# Patient Record
Sex: FEMALE | Race: WHITE | NOT HISPANIC OR LATINO | Employment: FULL TIME | URBAN - METROPOLITAN AREA
[De-identification: names, ages, dates, MRNs, and addresses within clinical notes are randomized per-mention and may not be internally consistent; named-entity substitution may affect disease eponyms.]

---

## 2023-06-13 LAB
ALBUMIN (G/DL) IN SER/PLAS: 3.9 G/DL (ref 3.4–5)
ANION GAP IN SER/PLAS: 12 MMOL/L (ref 10–20)
CALCIUM (MG/DL) IN SER/PLAS: 9.9 MG/DL (ref 8.6–10.3)
CARBON DIOXIDE, TOTAL (MMOL/L) IN SER/PLAS: 28 MMOL/L (ref 21–32)
CHLORIDE (MMOL/L) IN SER/PLAS: 104 MMOL/L (ref 98–107)
CREATININE (MG/DL) IN SER/PLAS: 1.03 MG/DL (ref 0.5–1.05)
CREATININE (MG/DL) IN URINE: 77.5 MG/DL (ref 20–320)
ERYTHROCYTE DISTRIBUTION WIDTH (RATIO) BY AUTOMATED COUNT: 13.1 % (ref 11.5–14.5)
ERYTHROCYTE MEAN CORPUSCULAR HEMOGLOBIN CONCENTRATION (G/DL) BY AUTOMATED: 31.7 G/DL (ref 32–36)
ERYTHROCYTE MEAN CORPUSCULAR VOLUME (FL) BY AUTOMATED COUNT: 90 FL (ref 80–100)
ERYTHROCYTES (10*6/UL) IN BLOOD BY AUTOMATED COUNT: 4.41 X10E12/L (ref 4–5.2)
GFR FEMALE: 60 ML/MIN/1.73M2
GLUCOSE (MG/DL) IN SER/PLAS: 97 MG/DL (ref 74–99)
HEMATOCRIT (%) IN BLOOD BY AUTOMATED COUNT: 39.7 % (ref 36–46)
HEMOGLOBIN (G/DL) IN BLOOD: 12.6 G/DL (ref 12–16)
LEUKOCYTES (10*3/UL) IN BLOOD BY AUTOMATED COUNT: 4.7 X10E9/L (ref 4.4–11.3)
MAGNESIUM (MG/DL) IN SER/PLAS: 1.6 MG/DL (ref 1.6–2.4)
PHOSPHATE (MG/DL) IN SER/PLAS: 3.4 MG/DL (ref 2.5–4.9)
PLATELETS (10*3/UL) IN BLOOD AUTOMATED COUNT: 175 X10E9/L (ref 150–450)
POTASSIUM (MMOL/L) IN SER/PLAS: 4.6 MMOL/L (ref 3.5–5.3)
PROTEIN (MG/DL) IN URINE: 5 MG/DL (ref 5–24)
PROTEIN/CREATININE (MG/MG) IN URINE: 0.06 MG/MG CREAT (ref 0–0.17)
SODIUM (MMOL/L) IN SER/PLAS: 139 MMOL/L (ref 136–145)
TACROLIMUS (NG/ML) IN BLOOD: 7.1 NG/ML (ref 2–15)
UREA NITROGEN (MG/DL) IN SER/PLAS: 28 MG/DL (ref 6–23)

## 2023-09-14 LAB
ALBUMIN (G/DL) IN SER/PLAS: 3.9 G/DL (ref 3.4–5)
ANION GAP IN SER/PLAS: 11 MMOL/L (ref 10–20)
CALCIUM (MG/DL) IN SER/PLAS: 9.6 MG/DL (ref 8.6–10.3)
CARBON DIOXIDE, TOTAL (MMOL/L) IN SER/PLAS: 27 MMOL/L (ref 21–32)
CHLORIDE (MMOL/L) IN SER/PLAS: 105 MMOL/L (ref 98–107)
CREATININE (MG/DL) IN SER/PLAS: 0.95 MG/DL (ref 0.5–1.05)
CREATININE (MG/DL) IN URINE: 75.1 MG/DL (ref 20–320)
ERYTHROCYTE DISTRIBUTION WIDTH (RATIO) BY AUTOMATED COUNT: 13.2 % (ref 11.5–14.5)
ERYTHROCYTE MEAN CORPUSCULAR HEMOGLOBIN CONCENTRATION (G/DL) BY AUTOMATED: 32.3 G/DL (ref 32–36)
ERYTHROCYTE MEAN CORPUSCULAR VOLUME (FL) BY AUTOMATED COUNT: 90 FL (ref 80–100)
ERYTHROCYTES (10*6/UL) IN BLOOD BY AUTOMATED COUNT: 4.43 X10E12/L (ref 4–5.2)
GFR FEMALE: 66 ML/MIN/1.73M2
GLUCOSE (MG/DL) IN SER/PLAS: 97 MG/DL (ref 74–99)
HEMATOCRIT (%) IN BLOOD BY AUTOMATED COUNT: 39.9 % (ref 36–46)
HEMOGLOBIN (G/DL) IN BLOOD: 12.9 G/DL (ref 12–16)
LEUKOCYTES (10*3/UL) IN BLOOD BY AUTOMATED COUNT: 3.3 X10E9/L (ref 4.4–11.3)
MAGNESIUM (MG/DL) IN SER/PLAS: 1.78 MG/DL (ref 1.6–2.4)
PHOSPHATE (MG/DL) IN SER/PLAS: 3.2 MG/DL (ref 2.5–4.9)
PLATELETS (10*3/UL) IN BLOOD AUTOMATED COUNT: 170 X10E9/L (ref 150–450)
POTASSIUM (MMOL/L) IN SER/PLAS: 4.5 MMOL/L (ref 3.5–5.3)
PROTEIN (MG/DL) IN URINE: 5 MG/DL (ref 5–24)
PROTEIN/CREATININE (MG/MG) IN URINE: 0.07 MG/MG CREAT (ref 0–0.17)
SODIUM (MMOL/L) IN SER/PLAS: 138 MMOL/L (ref 136–145)
TACROLIMUS (NG/ML) IN BLOOD: 7.8 NG/ML (ref 2–15)
UREA NITROGEN (MG/DL) IN SER/PLAS: 25 MG/DL (ref 6–23)

## 2023-09-15 LAB
BASOPHILS (10*3/UL) IN BLOOD BY AUTOMATED COUNT: 0.03 X10E9/L (ref 0–0.1)
BASOPHILS/100 LEUKOCYTES IN BLOOD BY AUTOMATED COUNT: 0.9 % (ref 0–2)
EOSINOPHILS (10*3/UL) IN BLOOD BY AUTOMATED COUNT: 0.03 X10E9/L (ref 0–0.7)
EOSINOPHILS/100 LEUKOCYTES IN BLOOD BY AUTOMATED COUNT: 0.9 % (ref 0–6)
IMMATURE GRANULOCYTES/100 LEUKOCYTES IN BLOOD BY AUTOMATED COUNT: 0.3 % (ref 0–0.9)
LYMPHOCYTES (10*3/UL) IN BLOOD BY AUTOMATED COUNT: 1.19 X10E9/L (ref 1.2–4.8)
LYMPHOCYTES/100 LEUKOCYTES IN BLOOD BY AUTOMATED COUNT: 33.8 % (ref 13–44)
MONOCYTES (10*3/UL) IN BLOOD BY AUTOMATED COUNT: 0.29 X10E9/L (ref 0.1–1)
MONOCYTES/100 LEUKOCYTES IN BLOOD BY AUTOMATED COUNT: 8.2 % (ref 2–10)
NEUTROPHILS (10*3/UL) IN BLOOD BY AUTOMATED COUNT: 1.97 X10E9/L (ref 1.2–7.7)
NEUTROPHILS/100 LEUKOCYTES IN BLOOD BY AUTOMATED COUNT: 55.9 % (ref 40–80)

## 2023-09-18 LAB
CYTOMEGALOVIRUS DNA, PCR COMMENT: NORMAL
CYTOMEGALOVIRUS DNA, PCR IU/ML: NOT DETECTED IU/ML
CYTOMEGALOVIRUS DNA, PCR LOG IU/ML: NORMAL LOG IU/ML
EBV PCR PLASMA LOG IU/ML: NORMAL LOG IU/ML
EBV PCR, QUANT, PLASMA: NOT DETECTED IU/ML

## 2023-09-26 LAB
ALBUMIN (G/DL) IN SER/PLAS: 4 G/DL (ref 3.4–5)
ANION GAP IN SER/PLAS: 11 MMOL/L (ref 10–20)
BASOPHILS (10*3/UL) IN BLOOD BY AUTOMATED COUNT: 0.03 X10E9/L (ref 0–0.1)
BASOPHILS/100 LEUKOCYTES IN BLOOD BY AUTOMATED COUNT: 0.7 % (ref 0–2)
CALCIUM (MG/DL) IN SER/PLAS: 9.9 MG/DL (ref 8.6–10.3)
CARBON DIOXIDE, TOTAL (MMOL/L) IN SER/PLAS: 27 MMOL/L (ref 21–32)
CHLORIDE (MMOL/L) IN SER/PLAS: 105 MMOL/L (ref 98–107)
CREATININE (MG/DL) IN SER/PLAS: 0.97 MG/DL (ref 0.5–1.05)
EOSINOPHILS (10*3/UL) IN BLOOD BY AUTOMATED COUNT: 0.04 X10E9/L (ref 0–0.7)
EOSINOPHILS/100 LEUKOCYTES IN BLOOD BY AUTOMATED COUNT: 0.9 % (ref 0–6)
ERYTHROCYTE DISTRIBUTION WIDTH (RATIO) BY AUTOMATED COUNT: 13 % (ref 11.5–14.5)
ERYTHROCYTE MEAN CORPUSCULAR HEMOGLOBIN CONCENTRATION (G/DL) BY AUTOMATED: 33.8 G/DL (ref 32–36)
ERYTHROCYTE MEAN CORPUSCULAR VOLUME (FL) BY AUTOMATED COUNT: 89 FL (ref 80–100)
ERYTHROCYTES (10*6/UL) IN BLOOD BY AUTOMATED COUNT: 4.52 X10E12/L (ref 4–5.2)
GFR FEMALE: 65 ML/MIN/1.73M2
GLUCOSE (MG/DL) IN SER/PLAS: 109 MG/DL (ref 74–99)
HEMATOCRIT (%) IN BLOOD BY AUTOMATED COUNT: 40.2 % (ref 36–46)
HEMOGLOBIN (G/DL) IN BLOOD: 13.6 G/DL (ref 12–16)
IMMATURE GRANULOCYTES/100 LEUKOCYTES IN BLOOD BY AUTOMATED COUNT: 0.2 % (ref 0–0.9)
LEUKOCYTES (10*3/UL) IN BLOOD BY AUTOMATED COUNT: 4.4 X10E9/L (ref 4.4–11.3)
LYMPHOCYTES (10*3/UL) IN BLOOD BY AUTOMATED COUNT: 1.18 X10E9/L (ref 1.2–4.8)
LYMPHOCYTES/100 LEUKOCYTES IN BLOOD BY AUTOMATED COUNT: 26.7 % (ref 13–44)
MONOCYTES (10*3/UL) IN BLOOD BY AUTOMATED COUNT: 0.45 X10E9/L (ref 0.1–1)
MONOCYTES/100 LEUKOCYTES IN BLOOD BY AUTOMATED COUNT: 10.2 % (ref 2–10)
NEUTROPHILS (10*3/UL) IN BLOOD BY AUTOMATED COUNT: 2.71 X10E9/L (ref 1.2–7.7)
NEUTROPHILS/100 LEUKOCYTES IN BLOOD BY AUTOMATED COUNT: 61.3 % (ref 40–80)
PHOSPHATE (MG/DL) IN SER/PLAS: 3.5 MG/DL (ref 2.5–4.9)
PLATELETS (10*3/UL) IN BLOOD AUTOMATED COUNT: 154 X10E9/L (ref 150–450)
POTASSIUM (MMOL/L) IN SER/PLAS: 4.7 MMOL/L (ref 3.5–5.3)
SODIUM (MMOL/L) IN SER/PLAS: 138 MMOL/L (ref 136–145)
TACROLIMUS (NG/ML) IN BLOOD: 9.9 NG/ML (ref 2–15)
UREA NITROGEN (MG/DL) IN SER/PLAS: 26 MG/DL (ref 6–23)

## 2023-10-17 ENCOUNTER — LAB (OUTPATIENT)
Dept: LAB | Facility: LAB | Age: 68
End: 2023-10-17
Payer: MEDICARE

## 2023-10-17 DIAGNOSIS — D84.9 IMMUNODEFICIENCY, UNSPECIFIED (MULTI): Primary | ICD-10-CM

## 2023-10-17 DIAGNOSIS — Z94.0 KIDNEY REPLACED BY TRANSPLANT (HHS-HCC): ICD-10-CM

## 2023-10-17 LAB
ALBUMIN SERPL BCP-MCNC: 3.7 G/DL (ref 3.4–5)
ANION GAP SERPL CALC-SCNC: 11 MMOL/L (ref 10–20)
BUN SERPL-MCNC: 16 MG/DL (ref 6–23)
CALCIUM SERPL-MCNC: 9.3 MG/DL (ref 8.6–10.3)
CHLORIDE SERPL-SCNC: 106 MMOL/L (ref 98–107)
CO2 SERPL-SCNC: 26 MMOL/L (ref 21–32)
CREAT SERPL-MCNC: 0.89 MG/DL (ref 0.5–1.05)
ERYTHROCYTE [DISTWIDTH] IN BLOOD BY AUTOMATED COUNT: 13.1 % (ref 11.5–14.5)
GFR SERPL CREATININE-BSD FRML MDRD: 72 ML/MIN/1.73M*2
GLUCOSE SERPL-MCNC: 100 MG/DL (ref 74–99)
HCT VFR BLD AUTO: 38.7 % (ref 36–46)
HGB BLD-MCNC: 12.6 G/DL (ref 12–16)
MAGNESIUM SERPL-MCNC: 1.75 MG/DL (ref 1.6–2.4)
MCH RBC QN AUTO: 29.2 PG (ref 26–34)
MCHC RBC AUTO-ENTMCNC: 32.6 G/DL (ref 32–36)
MCV RBC AUTO: 90 FL (ref 80–100)
NRBC BLD-RTO: 0 /100 WBCS (ref 0–0)
PHOSPHATE SERPL-MCNC: 3.3 MG/DL (ref 2.5–4.9)
PLATELET # BLD AUTO: 168 X10*3/UL (ref 150–450)
PMV BLD AUTO: 10.1 FL (ref 7.5–11.5)
POTASSIUM SERPL-SCNC: 4.4 MMOL/L (ref 3.5–5.3)
RBC # BLD AUTO: 4.31 X10*6/UL (ref 4–5.2)
SODIUM SERPL-SCNC: 139 MMOL/L (ref 136–145)
WBC # BLD AUTO: 3.1 X10*3/UL (ref 4.4–11.3)

## 2023-10-17 PROCEDURE — 83735 ASSAY OF MAGNESIUM: CPT

## 2023-10-17 PROCEDURE — 80197 ASSAY OF TACROLIMUS: CPT

## 2023-10-17 PROCEDURE — 85027 COMPLETE CBC AUTOMATED: CPT

## 2023-10-17 PROCEDURE — 36415 COLL VENOUS BLD VENIPUNCTURE: CPT

## 2023-10-17 PROCEDURE — 80069 RENAL FUNCTION PANEL: CPT

## 2023-10-18 ENCOUNTER — DOCUMENTATION (OUTPATIENT)
Dept: TRANSPLANT | Facility: HOSPITAL | Age: 68
End: 2023-10-18
Payer: MEDICARE

## 2023-10-18 DIAGNOSIS — Z94.0 KIDNEY REPLACED BY TRANSPLANT (HHS-HCC): ICD-10-CM

## 2023-10-18 LAB — TACROLIMUS BLD-MCNC: 5.4 NG/ML

## 2023-10-18 NOTE — PROGRESS NOTES
WBC 3.1, 4.4, 3.3, 4.7   On  mg BID  -  Reviewed with Dr. Pierce, add on CMV/EBV and continue to monitor. No changes to meds.  Labs added on

## 2023-10-19 LAB
CMV DNA SERPL NAA+PROBE-LOG IU: NORMAL {LOG_IU}/ML
LABORATORY COMMENT REPORT: NOT DETECTED

## 2023-11-13 ENCOUNTER — SPECIALTY PHARMACY (OUTPATIENT)
Dept: PHARMACY | Facility: CLINIC | Age: 68
End: 2023-11-13

## 2023-11-13 ENCOUNTER — PHARMACY VISIT (OUTPATIENT)
Dept: PHARMACY | Facility: CLINIC | Age: 68
End: 2023-11-13
Payer: COMMERCIAL

## 2023-11-13 PROCEDURE — RXMED WILLOW AMBULATORY MEDICATION CHARGE

## 2023-12-18 PROCEDURE — RXMED WILLOW AMBULATORY MEDICATION CHARGE

## 2023-12-19 ENCOUNTER — PHARMACY VISIT (OUTPATIENT)
Dept: PHARMACY | Facility: CLINIC | Age: 68
End: 2023-12-19
Payer: COMMERCIAL

## 2024-01-11 ENCOUNTER — OFFICE VISIT (OUTPATIENT)
Dept: DERMATOLOGY | Facility: CLINIC | Age: 69
End: 2024-01-11
Payer: MEDICARE

## 2024-01-11 DIAGNOSIS — L57.0 ACTINIC KERATOSIS: ICD-10-CM

## 2024-01-11 DIAGNOSIS — Z12.83 SCREENING EXAM FOR SKIN CANCER: ICD-10-CM

## 2024-01-11 DIAGNOSIS — D48.5 NEOPLASM OF UNCERTAIN BEHAVIOR OF SKIN: ICD-10-CM

## 2024-01-11 DIAGNOSIS — L82.1 SEBORRHEIC KERATOSIS: ICD-10-CM

## 2024-01-11 DIAGNOSIS — L90.5 SCAR CONDITIONS AND FIBROSIS OF SKIN: Primary | ICD-10-CM

## 2024-01-11 DIAGNOSIS — L82.0 INFLAMED SEBORRHEIC KERATOSIS: ICD-10-CM

## 2024-01-11 DIAGNOSIS — L57.8 SUN-DAMAGED SKIN: ICD-10-CM

## 2024-01-11 DIAGNOSIS — L81.4 LENTIGO: ICD-10-CM

## 2024-01-11 DIAGNOSIS — D22.9 MULTIPLE BENIGN NEVI: ICD-10-CM

## 2024-01-11 DIAGNOSIS — Z85.820 PERSONAL HISTORY OF MALIGNANT MELANOMA OF SKIN: ICD-10-CM

## 2024-01-11 DIAGNOSIS — D18.01 HEMANGIOMA OF SKIN: ICD-10-CM

## 2024-01-11 PROCEDURE — 1125F AMNT PAIN NOTED PAIN PRSNT: CPT | Performed by: DERMATOLOGY

## 2024-01-11 PROCEDURE — 88305 TISSUE EXAM BY PATHOLOGIST: CPT | Performed by: DERMATOLOGY

## 2024-01-11 PROCEDURE — 99213 OFFICE O/P EST LOW 20 MIN: CPT | Performed by: DERMATOLOGY

## 2024-01-11 PROCEDURE — 17003 DESTRUCT PREMALG LES 2-14: CPT

## 2024-01-11 PROCEDURE — 1036F TOBACCO NON-USER: CPT | Performed by: DERMATOLOGY

## 2024-01-11 PROCEDURE — 1159F MED LIST DOCD IN RCRD: CPT | Performed by: DERMATOLOGY

## 2024-01-11 PROCEDURE — 11102 TANGNTL BX SKIN SINGLE LES: CPT

## 2024-01-11 PROCEDURE — 17000 DESTRUCT PREMALG LESION: CPT

## 2024-01-11 RX ORDER — FLUOROURACIL 50 MG/G
CREAM TOPICAL 2 TIMES DAILY
Qty: 40 G | Refills: 11 | Status: SHIPPED | OUTPATIENT
Start: 2024-01-11 | End: 2024-01-25

## 2024-01-11 RX ORDER — DULOXETIN HYDROCHLORIDE 30 MG/1
1 CAPSULE, DELAYED RELEASE ORAL EVERY 24 HOURS
COMMUNITY

## 2024-01-11 RX ORDER — CEFADROXIL 500 MG/1
500 CAPSULE ORAL DAILY
COMMUNITY
End: 2024-01-23 | Stop reason: SDUPTHER

## 2024-01-11 RX ORDER — DICYCLOMINE HYDROCHLORIDE 10 MG/1
CAPSULE ORAL EVERY 12 HOURS
COMMUNITY
Start: 2016-12-30

## 2024-01-11 RX ORDER — CA/D3/MAG OX/ZINC/COP/MANG/BOR 600 MG-800
TABLET,CHEWABLE ORAL EVERY 12 HOURS
COMMUNITY

## 2024-01-11 ASSESSMENT — DERMATOLOGY PATIENT ASSESSMENT
DO YOU HAVE ANY NEW OR CHANGING LESIONS: NO
HAVE YOU HAD OR DO YOU HAVE VASCULAR DISEASE: NO
DO YOU USE SUNSCREEN: OCCASIONALLY
HAVE YOU HAD OR DO YOU HAVE A STAPH INFECTION: NO
DO YOU USE A TANNING BED: NO
ARE YOU AN ORGAN TRANSPLANT RECIPIENT: YES
ARE YOU ON BIRTH CONTROL: NO
ARE YOU TRYING TO GET PREGNANT: NO

## 2024-01-11 ASSESSMENT — DERMATOLOGY QUALITY OF LIFE (QOL) ASSESSMENT
RATE HOW BOTHERED YOU ARE BY SYMPTOMS OF YOUR SKIN PROBLEM (EG, ITCHING, STINGING BURNING, HURTING OR SKIN IRRITATION): 0 - NEVER BOTHERED
RATE HOW EMOTIONALLY BOTHERED YOU ARE BY YOUR SKIN PROBLEM (FOR EXAMPLE, WORRY, EMBARRASSMENT, FRUSTRATION): 0 - NEVER BOTHERED
RATE HOW BOTHERED YOU ARE BY EFFECTS OF YOUR SKIN PROBLEMS ON YOUR ACTIVITIES (EG, GOING OUT, ACCOMPLISHING WHAT YOU WANT, WORK ACTIVITIES OR YOUR RELATIONSHIPS WITH OTHERS): 0 - NEVER BOTHERED
ARE THERE EXCLUSIONS OR EXCEPTIONS FOR THE QUALITY OF LIFE ASSESSMENT: NO
WHAT SINGLE SKIN CONDITION LISTED BELOW IS THE PATIENT ANSWERING THE QUALITY-OF-LIFE ASSESSMENT QUESTIONS ABOUT: NONE OF THE ABOVE
DATE THE QUALITY-OF-LIFE ASSESSMENT WAS COMPLETED: 66850

## 2024-01-11 ASSESSMENT — ITCH NUMERIC RATING SCALE: HOW SEVERE IS YOUR ITCHING?: 0

## 2024-01-11 ASSESSMENT — PATIENT GLOBAL ASSESSMENT (PGA): PATIENT GLOBAL ASSESSMENT: PATIENT GLOBAL ASSESSMENT:  1 - CLEAR

## 2024-01-11 NOTE — PROGRESS NOTES
Subjective   Cady Neal is a 68 y.o. female who presents for the following: Skin Check (Patient presents for 6 month skin exam).    Skin Cancer Screening  She has a history of moderate sun exposure. She is in the sun occasionally. She uses sunscreen occasionally. She reports no skin symptoms. Her moles are not changing.    Spots that concern her: None    History of Skin Cancer  Melanoma - left upper arm s/p mohs 11/12/2020 with Dr. 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 Alejandro    Critical information:  Kidney transplant      Objective   Well appearing patient in no apparent distress; mood and affect are within normal limits.    A full examination was performed including scalp, head, eyes, ears, nose, lips, neck, chest, axillae, abdomen, back, buttocks, bilateral upper extremities, bilateral lower extremities, hands, feet, fingers, toes, fingernails, and toenails. All findings within normal limits unless otherwise noted below.    Well-healed scar on left upper arm     Scattered cherry-red papule(s).    Scattered tan macules in sun-exposed areas.    Scattered, uniform and benign-appearing, regular brown melanocytic papules and macules.    Stuck on verrucous, tan-brown papules and plaques.      Diffuse photodamage with actinic changes with telangiectasia and mottled pigmentation in sun-exposed areas.      Right Antecubital Fossa  On the right antecubital fossa, there is a brown pigmented macule with darker pigmentation within.               Left eyebrow, Mid Forehead  Erythematous macules with gritty scale.    Left Malar Cheek, Right Lower Leg - Posterior  On the right posterior lower leg there is an erythematous scaly plaque. On the left cheek there is a skin colored to slightly erythematous scaly papule.       Assessment/Plan   Personal history of malignant melanoma of skin    -Malignant melanoma of left upper arm (breslow 0.2mm) s/p mohs 11/12/2020 with Dr. Catherine    Hemangioma of  skin    - Discussed benign nature and that no treatment is necessary unless it becomes painful or increases in size. Patient opts for clinical monitoring at this time.     Lentigo    - Discussed benign nature and that no treatment is necessary unless it becomes painful or increases in size. Patient opts for clinical monitoring at this time.     Multiple benign nevi    - Discussed benign nature and that no treatment is necessary unless it becomes painful or increases in size. Patient opts for clinical monitoring at this time.     Seborrheic keratosis    - Discussed benign nature and that no treatment is necessary unless it becomes painful or increases in size. Patient opts for clinical monitoring at this time.     Sun-damaged skin    Photodamage.  The signs and symptoms of skin cancer were reviewed and the patient was advised to practice sun protection and sun avoidance, use daily sunscreen, and perform regular self skin exams.      Neoplasm of uncertain behavior of skin  Right Antecubital Fossa    Lesion biopsy  Type of biopsy: tangential    Informed consent: discussed and consent obtained    Timeout: patient name, date of birth, surgical site, and procedure verified    Procedure prep:  Patient was prepped and draped  Anesthesia: the lesion was anesthetized in a standard fashion    Anesthetic:  1% lidocaine w/ epinephrine 1-100,000 local infiltration  Instrument used: DermaBlade    Hemostasis achieved with: aluminum chloride    Outcome: patient tolerated procedure well    Post-procedure details: sterile dressing applied and wound care instructions given    Dressing type: petrolatum and bandage      Specimen 1 - Dermatopathology- DERM LAB  Differential Diagnosis: dysplastic nevus vs melanoma   Check Margins Yes/No?:    Comments:    Dermpath Lab: Routine Histopathology (formalin-fixed tissue)    Actinic keratosis (2)  Mid Forehead; Left eyebrow    Actinic Keratoses - left eyebrow, mid forehead, and right cheek.     -The pre-cancerous nature of these lesions and treatment options were discussed with the patient today.  Recommend treatment of actinic keratoses on left eyebrow and mid forehead with LN2. Due to size of actinic keratosis on right cheek recommend LN2.   -After discussion, patient agreeable to plan.   -The risks, benefits, and side effects of these medications, including the redness, irritation, and discomfort associated with Efudex were discussed. Rx sent.       Destr of lesion - Left eyebrow, Mid Forehead  Complexity: simple    Destruction method: cryotherapy    Informed consent: discussed and consent obtained    Lesion destroyed using liquid nitrogen: Yes    Cryotherapy cycles:  1  Outcome: patient tolerated procedure well with no complications    Post-procedure details: wound care instructions given      Related Medications  fluorouracil (Efudex) 5 % cream  Apply topically 2 times a day for 14 days. Apply to the affected sharon of the right cheek twice daily for a total of 2 weeks. Wash hands thoroughly after each application.    Inflamed seborrheic keratosis (2)  Right Lower Leg - Posterior; Left Malar Cheek    Destr of lesion - Left Malar Cheek, Right Lower Leg - Posterior  Complexity: simple    Destruction method: cryotherapy    Informed consent: discussed and consent obtained    Lesion destroyed using liquid nitrogen: Yes    Cryotherapy cycles:  1  Outcome: patient tolerated procedure well with no complications    Post-procedure details: wound care instructions given      Screening exam for skin cancer    Full body skin exam  -No additional lesions concerning for malignancy on the remainder the skin exam today   -Scars from prior skin treatments were evaluated and no evidence of recurrence.  - The ugly duckling sign was discussed. Monitor for any skin lesions that are different in color, shape, or size than others on body  -Sun protection was discussed. Recommend SPF 30+, hats with brims, sun protective  clothing, and avoiding sun exposure between 10 AM and 2 PM whenever possible  -Recommend regular skin exams or sooner if new or changing lesions         RTC in 6 months for FBSE.     Michelle Rodriguez, DO     I saw and evaluated the patient. I personally obtained the key and critical portions of the history and physical exam or was physically present for key and critical portions performed by the student/resident. I reviewed the student/resident's documentation and discussed the patient with the student/resident. I was present for the entirety of any procedure(s). I agree with the student/resident's medical decision making as documented in the note.

## 2024-01-15 LAB
LABORATORY COMMENT REPORT: NORMAL
PATH REPORT.FINAL DX SPEC: NORMAL
PATH REPORT.GROSS SPEC: NORMAL
PATH REPORT.MICROSCOPIC SPEC OTHER STN: NORMAL
PATH REPORT.RELEVANT HX SPEC: NORMAL
PATH REPORT.TOTAL CANCER: NORMAL

## 2024-01-22 ENCOUNTER — SPECIALTY PHARMACY (OUTPATIENT)
Dept: PHARMACY | Facility: CLINIC | Age: 69
End: 2024-01-22

## 2024-01-22 ENCOUNTER — PHARMACY VISIT (OUTPATIENT)
Dept: PHARMACY | Facility: CLINIC | Age: 69
End: 2024-01-22
Payer: COMMERCIAL

## 2024-01-22 PROCEDURE — RXMED WILLOW AMBULATORY MEDICATION CHARGE

## 2024-01-23 ENCOUNTER — OFFICE VISIT (OUTPATIENT)
Dept: INFECTIOUS DISEASES | Facility: CLINIC | Age: 69
End: 2024-01-23
Payer: MEDICARE

## 2024-01-23 VITALS
TEMPERATURE: 98.1 F | BODY MASS INDEX: 29.88 KG/M2 | SYSTOLIC BLOOD PRESSURE: 111 MMHG | HEART RATE: 67 BPM | DIASTOLIC BLOOD PRESSURE: 74 MMHG | HEIGHT: 64 IN | WEIGHT: 175 LBS

## 2024-01-23 DIAGNOSIS — N39.0 RECURRENT UTI: Primary | ICD-10-CM

## 2024-01-23 PROCEDURE — 99214 OFFICE O/P EST MOD 30 MIN: CPT | Performed by: INTERNAL MEDICINE

## 2024-01-23 PROCEDURE — 1125F AMNT PAIN NOTED PAIN PRSNT: CPT | Performed by: INTERNAL MEDICINE

## 2024-01-23 PROCEDURE — 1159F MED LIST DOCD IN RCRD: CPT | Performed by: INTERNAL MEDICINE

## 2024-01-23 PROCEDURE — 1036F TOBACCO NON-USER: CPT | Performed by: INTERNAL MEDICINE

## 2024-01-23 RX ORDER — CHOLECALCIFEROL (VITAMIN D3) 50 MCG
20 CAPSULE ORAL 2 TIMES DAILY
COMMUNITY
Start: 2008-08-27

## 2024-01-23 RX ORDER — DOXYCYCLINE 100 MG/1
100 TABLET ORAL DAILY
COMMUNITY

## 2024-01-23 RX ORDER — ACETAMINOPHEN 500 MG
1 TABLET ORAL DAILY
COMMUNITY
Start: 2008-08-27 | End: 2024-03-22 | Stop reason: SDUPTHER

## 2024-01-23 RX ORDER — CEFADROXIL 500 MG/1
500 CAPSULE ORAL DAILY
Qty: 90 CAPSULE | Refills: 3 | Status: SHIPPED | OUTPATIENT
Start: 2024-01-23 | End: 2024-04-22

## 2024-01-23 RX ORDER — SOD SULF/POT CHLORIDE/MAG SULF 1.479 G
TABLET ORAL
COMMUNITY
Start: 2023-04-12

## 2024-01-23 ASSESSMENT — ENCOUNTER SYMPTOMS
DEPRESSION: 0
OCCASIONAL FEELINGS OF UNSTEADINESS: 1
LOSS OF SENSATION IN FEET: 1

## 2024-01-23 ASSESSMENT — PAIN SCALES - GENERAL: PAINLEVEL: 5

## 2024-01-23 NOTE — LETTER
01/23/24    Curt Chang MD  [id number] [facility] [address]  [place] [address]      Dear Dr. Curt Chang MD,    I am writing to confirm that your patient, Cady Neal, received care in my office on 01/23/24. I have enclosed a summary of the care provided to Cady for your reference.    Please contact me with any questions you may have regarding the visit.    Sincerely,         Mick Malin MD  [address]  [facility] [address]  [place] [id number]    CC: No Recipients

## 2024-01-23 NOTE — PROGRESS NOTES
Prior to seeing the patient we reviewed all the information in the  system from last year     Annual follow-up for recurrent UTIs in the setting of kidney transplant.  We seen her first year ago.  She had been followed for number years by a colleague who left the institution.  She had her on oral cephalosporin chronic suppression which we continue last year because to see me working.  In the past she had been on estrogen topical but not taking this now.  When I saw her today she said she has not had a UTI in the last year.  Continues on cefadroxil without difficulty.    In person visit and patient looks well normal mood and affect normal mentation    Assessment/plan- patient appears to doing very well on chronic suppressive therapy with cefadroxil.  As I told her the first only letter this strategy often does not work in postmenopausal women tend to get frequently colonized to do bacteria but it does appear to work in her.  Her prior urine cultures all appear to be the same straight E. 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  Will continue strategy as long as it works.  Would put a standing order for urine culture urinalysis.  We scheduled her back in a year but we will call her if there is any intercurrent UTIs.  Currently not using topical estrogen.  If she starts having frequent UTIs we would readdress this issue but doing well for now

## 2024-02-12 ENCOUNTER — SPECIALTY PHARMACY (OUTPATIENT)
Dept: PHARMACY | Facility: CLINIC | Age: 69
End: 2024-02-12

## 2024-02-16 ENCOUNTER — PHARMACY VISIT (OUTPATIENT)
Dept: PHARMACY | Facility: CLINIC | Age: 69
End: 2024-02-16
Payer: COMMERCIAL

## 2024-02-16 PROCEDURE — RXMED WILLOW AMBULATORY MEDICATION CHARGE

## 2024-03-13 ENCOUNTER — LAB (OUTPATIENT)
Dept: LAB | Facility: LAB | Age: 69
End: 2024-03-13
Payer: MEDICARE

## 2024-03-13 DIAGNOSIS — D84.9 IMMUNODEFICIENCY, UNSPECIFIED (MULTI): Primary | ICD-10-CM

## 2024-03-13 DIAGNOSIS — Z94.0 KIDNEY REPLACED BY TRANSPLANT (HHS-HCC): ICD-10-CM

## 2024-03-13 DIAGNOSIS — D84.9 IMMUNODEFICIENCY, UNSPECIFIED (MULTI): ICD-10-CM

## 2024-03-13 LAB
ALBUMIN SERPL BCP-MCNC: 3.8 G/DL (ref 3.4–5)
ANION GAP SERPL CALC-SCNC: 11 MMOL/L (ref 10–20)
BUN SERPL-MCNC: 26 MG/DL (ref 6–23)
CALCIUM SERPL-MCNC: 9.4 MG/DL (ref 8.6–10.6)
CHLORIDE SERPL-SCNC: 109 MMOL/L (ref 98–107)
CO2 SERPL-SCNC: 25 MMOL/L (ref 21–32)
CREAT SERPL-MCNC: 0.8 MG/DL (ref 0.5–1.05)
EGFRCR SERPLBLD CKD-EPI 2021: 81 ML/MIN/1.73M*2
ERYTHROCYTE [DISTWIDTH] IN BLOOD BY AUTOMATED COUNT: 13.3 % (ref 11.5–14.5)
GLUCOSE SERPL-MCNC: 108 MG/DL (ref 74–99)
HCT VFR BLD AUTO: 40.2 % (ref 36–46)
HGB BLD-MCNC: 13.1 G/DL (ref 12–16)
MAGNESIUM SERPL-MCNC: 1.76 MG/DL (ref 1.6–2.4)
MCH RBC QN AUTO: 29.1 PG (ref 26–34)
MCHC RBC AUTO-ENTMCNC: 32.6 G/DL (ref 32–36)
MCV RBC AUTO: 89 FL (ref 80–100)
NRBC BLD-RTO: 0 /100 WBCS (ref 0–0)
PHOSPHATE SERPL-MCNC: 3.1 MG/DL (ref 2.5–4.9)
PLATELET # BLD AUTO: 172 X10*3/UL (ref 150–450)
POTASSIUM SERPL-SCNC: 4.3 MMOL/L (ref 3.5–5.3)
RBC # BLD AUTO: 4.5 X10*6/UL (ref 4–5.2)
SODIUM SERPL-SCNC: 141 MMOL/L (ref 136–145)
TACROLIMUS BLD-MCNC: 4.7 NG/ML
WBC # BLD AUTO: 4 X10*3/UL (ref 4.4–11.3)

## 2024-03-13 PROCEDURE — 83735 ASSAY OF MAGNESIUM: CPT

## 2024-03-13 PROCEDURE — 85027 COMPLETE CBC AUTOMATED: CPT

## 2024-03-13 PROCEDURE — 87799 DETECT AGENT NOS DNA QUANT: CPT

## 2024-03-13 PROCEDURE — 80197 ASSAY OF TACROLIMUS: CPT

## 2024-03-13 PROCEDURE — 36415 COLL VENOUS BLD VENIPUNCTURE: CPT

## 2024-03-13 PROCEDURE — 80069 RENAL FUNCTION PANEL: CPT

## 2024-03-14 LAB
EBV DNA SPEC NAA+PROBE-LOG#: NORMAL {LOG_COPIES}/ML
LABORATORY COMMENT REPORT: NOT DETECTED

## 2024-03-15 ENCOUNTER — SPECIALTY PHARMACY (OUTPATIENT)
Dept: PHARMACY | Facility: CLINIC | Age: 69
End: 2024-03-15

## 2024-03-15 PROCEDURE — RXMED WILLOW AMBULATORY MEDICATION CHARGE

## 2024-03-16 ENCOUNTER — PHARMACY VISIT (OUTPATIENT)
Dept: PHARMACY | Facility: CLINIC | Age: 69
End: 2024-03-16
Payer: COMMERCIAL

## 2024-03-22 ENCOUNTER — OFFICE VISIT (OUTPATIENT)
Dept: TRANSPLANT | Facility: HOSPITAL | Age: 69
End: 2024-03-22
Payer: MEDICARE

## 2024-03-22 VITALS
SYSTOLIC BLOOD PRESSURE: 130 MMHG | OXYGEN SATURATION: 100 % | WEIGHT: 175.8 LBS | BODY MASS INDEX: 30.18 KG/M2 | TEMPERATURE: 97.4 F | DIASTOLIC BLOOD PRESSURE: 84 MMHG | HEART RATE: 67 BPM

## 2024-03-22 DIAGNOSIS — I10 ESSENTIAL HYPERTENSION: ICD-10-CM

## 2024-03-22 DIAGNOSIS — E55.9 VITAMIN D DEFICIENCY: ICD-10-CM

## 2024-03-22 DIAGNOSIS — Z94.0 KIDNEY REPLACED BY TRANSPLANT (HHS-HCC): ICD-10-CM

## 2024-03-22 DIAGNOSIS — Z94.0 IMMUNOSUPPRESSIVE MANAGEMENT ENCOUNTER FOLLOWING KIDNEY TRANSPLANT (HHS-HCC): Primary | ICD-10-CM

## 2024-03-22 DIAGNOSIS — Z79.899 IMMUNOSUPPRESSIVE MANAGEMENT ENCOUNTER FOLLOWING KIDNEY TRANSPLANT (HHS-HCC): Primary | ICD-10-CM

## 2024-03-22 DIAGNOSIS — E21.3 HYPERPARATHYROIDISM (MULTI): ICD-10-CM

## 2024-03-22 PROCEDURE — 3079F DIAST BP 80-89 MM HG: CPT | Performed by: INTERNAL MEDICINE

## 2024-03-22 PROCEDURE — 3075F SYST BP GE 130 - 139MM HG: CPT | Performed by: INTERNAL MEDICINE

## 2024-03-22 PROCEDURE — 1036F TOBACCO NON-USER: CPT | Performed by: INTERNAL MEDICINE

## 2024-03-22 PROCEDURE — 99214 OFFICE O/P EST MOD 30 MIN: CPT | Performed by: INTERNAL MEDICINE

## 2024-03-22 PROCEDURE — 1126F AMNT PAIN NOTED NONE PRSNT: CPT | Performed by: INTERNAL MEDICINE

## 2024-03-22 PROCEDURE — 1159F MED LIST DOCD IN RCRD: CPT | Performed by: INTERNAL MEDICINE

## 2024-03-22 RX ORDER — TACROLIMUS 1 MG/1
CAPSULE ORAL
Qty: 60 CAPSULE | Refills: 11 | Status: SHIPPED | OUTPATIENT
Start: 2024-03-22 | End: 2025-03-22

## 2024-03-22 RX ORDER — MYCOPHENOLATE MOFETIL 250 MG/1
500 CAPSULE ORAL 2 TIMES DAILY
Qty: 120 CAPSULE | Refills: 11 | Status: SHIPPED | OUTPATIENT
Start: 2024-03-22 | End: 2025-03-22

## 2024-03-22 ASSESSMENT — PAIN SCALES - GENERAL: PAINLEVEL: 0-NO PAIN

## 2024-03-22 NOTE — PROGRESS NOTES
TRANSPLANT NEPHROLOGY :   OUTPATIENT CLINIC NOTE      SERVICE DATE : 2024    REASON FOR VISIT/CHIEF COMPLAINT:  S/P  TRANSPLANT SURGERY  IMMUNOSUPPRESSIVE MEDICATION MANAGEMENT  BLOOD PRESSURE MANAGEMENT    HPI:    Ms. Neal is a 68 y.o. female with past medical history significant for end stage renal disease secondary to Polycystic kidney disease status post living donor kidney transplant on 07. Donor was her . She underwent bilateral native nephrectomy on 16. She sees ID, for recurrent UTIs and is on suppressive antibiotic prophylaxis. Last visit with ID was in 2024. Per note, Continue prophylactic cefadroxil 500 mg daily and Premarin cream for UTI prevention and follow up in a year.     Patient is here for follow up s/p kidney transplant.    Patient is doing well overall. No new complaints. Denied chest pain, SOB, GOETZ, Palpitation. Normal urination and bowel movement. Normal gait and no weakness of arms/legs. No cough, runny nose, sore throat, cold symptoms, or rash. No hearing loss. Normal vision.No problems with his sleep, mood and function. No recent infection, hospitalization, surgery or ER visits.      ROS:  Review of  14 systems was performed system by system. See HPI. Otherwise, the symptoms were negative.    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     History of polycystic kidney disease    Pruritus vulvae 2014    Vulvar itching    Squamous cell skin cancer         PAST SURGICAL HISTORY:  Past Surgical History:   Procedure Laterality Date     SECTION,  CLASSIC  2014     Section    FOOT SURGERY  2014    Foot Surgery    KIDNEY SURGERY  2013    Kidney Surgery    MR HEAD ANGIO WO IV CONTRAST  2012    MR HEAD ANGIO WO IV CONTRAST 2012 [facility]        SOCIAL HISTORY:  Social History     Socioeconomic History    Marital status:      Spouse name: Not on file    Number of children: Not on file    Years of education: Not on file    Highest education level: Not on file   Occupational History    Not on file   Tobacco Use    Smoking status: Never    Smokeless tobacco: Never   Substance and Sexual Activity    Alcohol use: Not on file    Drug use: Not on file    Sexual activity: Not on file   Other Topics Concern    Not on file   Social History Narrative    Not on file     Social Determinants of Health     Financial Resource Strain: Not on file   Food Insecurity: Not on file   Transportation Needs: Not on file   Physical Activity: Not on file   Stress: Not on file   Social Connections: Not on file   Intimate Partner Violence: Not on file   Housing Stability: Not on file       FAMILY HISTORY:  Family History   Problem Relation Name Age of Onset    Skin cancer Brother         MEDICATION LIST:  Current Outpatient Medications   Medication Instructions    Ca-D3-mag-zinc--colette-boron (Caltrate 600-D Plus Minerals) 600 mg calcium- 800 unit-40 mg tablet,chewable Every 12 hours    calcium carbonate-vitamin D3 (Caltrate with Vitamin D3) 600 mg-20 mcg (800 unit) tablet 1 tablet, oral, Daily    cefadroxil (DURICEF) 500 mg, oral, Daily    dicyclomine (Bentyl) 10 mg capsule Every 12 hours    doxycycline (ADOXA) 100 mg, oral, Daily    DULoxetine (Cymbalta) 30 mg DR capsule 1 capsule, Every 24 hours    mycophenolate (Cellcept) 250 mg capsule TAKE TWO (2) CAPSULES BY MOUTH TWICE DAILY.    omeprazole (PRILOSEC) 20 mg, oral, 2 times daily    Sutab 1.479-0.188- 0.225 gram tablet oral, Use as directed    tacrolimus (Prograf) 1 mg capsule TAKE  ONE (1) CAPSULES BY MOUTH TWICE DAILY.       ALLERGY  No Known Allergies    PHYSICAL EXAM:    Visit Vitals  /84   Pulse 67   Temp 36.3 °C (97.4 °F) (Temporal)   Wt 79.7 kg (175 lb 12.8 oz)   SpO2 100%   BMI 30.18 kg/m²   Smoking Status Never   BSA 1.9 m²          Vital signs - reviewed. Acceptable BP at this office visit.   General Appearance - NAD, Good speech, oriented and alert  HEENT - Supple. Not pale. No jaundice. No cervical lymphadenopathy. Pharynx and tonsils are not injected.  CVS - RRR. Normal S1/S2. No murmur, click , rub or gallop  Lungs- clear to auscultation bilaterally  Abdomen - soft , not tender, no guarding, no rigidity. No hepatosplenomegaly. Normal bowel sounds. No masses and ascites. S/P Kidney transplant .  Transplanted kidney is not tender.   Musculoskeletal /Extremities - no edema. Full ROM. No joint tenderness.   Neuro/Psych - appropriate mood and affect. Motor power V/V all extremities. CN I -XII were grossly intact.  Skin - No visible rash      LABS:    Lab Results   Component Value Date    WBC 4.0 (L) 03/13/2024    HGB 13.1 03/13/2024    HCT 40.2 03/13/2024     03/13/2024    CHOL 182 12/08/2020    TRIG 102 12/08/2020    HDL 60.0 12/08/2020     03/13/2024    K 4.3 03/13/2024     (H) 03/13/2024    CREATININE 0.80 03/13/2024    BUN 26 (H) 03/13/2024    CO2 25 03/13/2024    HGBA1C 4.7 12/08/2020     par    ASSESSMENT AND PLAN:    Ms. Neal is a 68 y.o. female  who is here for follow up s/p kidney transplant.    TRANSPLANT DATE: 11/30/2007 (Kidney)      1. ESRD S/P kidney transplant   - Creatinine last check was :  Lab Results   Component Value Date    CREATININE 0.80 03/13/2024     Creatinine clearance cannot be calculated (Patient's most recent lab result is older than the maximum 7 days allowed.)    - Renal allograft function is EXCELLENT (16 years and 3 months post tx).   -Random urine protein/creatinine ratio is DUE  -Ensure adequate hydration  - Avoid  nephrotoxic medications, NSAIDs, and IV contrast.    2. Immunosuppression  -Tacrolimus level last check was 4.7, at goal. Continue same dose.   -No changes of IS [ Tac 1 mg bid,  MG BID]  -Continue current immunosuppression regimen.    3. Electrolytes  Lab Results   Component Value Date    GLUCOSE 108 (H) 03/13/2024    CALCIUM 9.4 03/13/2024     03/13/2024    K 4.3 03/13/2024    CO2 25 03/13/2024     (H) 03/13/2024    BUN 26 (H) 03/13/2024    CREATININE 0.80 03/13/2024     -Acceptable from last lab drawn    4. Hypertension  Blood Pressures         3/22/2024  0916             BP: 130/84          -Home  BP had been acceptable  -Encourage to monitor home BP  -Continue current anti hypertensive medication    5. Bone Mineral Disease/Osteoporosis  Lab Results   Component Value Date    PTH 95.8 (H) 10/07/2022    CALCIUM 9.4 03/13/2024    PHOS 3.1 03/13/2024    VITD25 51 10/07/2022     -check VIT D, PTH with next lab  - Consider DEXA every 2-3 years , defer to PCP    6.Anemia  Lab Results   Component Value Date    WBC 4.0 (L) 03/13/2024    HGB 13.1 03/13/2024    HCT 40.2 03/13/2024    MCV 89 03/13/2024     03/13/2024     -asymptomatic  - Continue to monitor  -check iron studies and ferritin. Will consider MOISES as needed.  - No indications for blood transfusion     7.Health maintenance and vaccination  - Flu shot during flu season annually  - Cancer screening is up to date per the patient    Lab : Routine transplant lab ( CBC, RFP, and anti-rejection trough level ), UPCratio every 3 months  Additional labs:  VIT D, PTH with next lab    RTC 12 month(s)    Angel Cerda    Transplant Nephrology

## 2024-04-16 PROCEDURE — RXMED WILLOW AMBULATORY MEDICATION CHARGE

## 2024-04-25 ENCOUNTER — SPECIALTY PHARMACY (OUTPATIENT)
Dept: PHARMACY | Facility: CLINIC | Age: 69
End: 2024-04-25

## 2024-04-29 ENCOUNTER — PHARMACY VISIT (OUTPATIENT)
Dept: PHARMACY | Facility: CLINIC | Age: 69
End: 2024-04-29
Payer: COMMERCIAL

## 2024-04-29 ENCOUNTER — SPECIALTY PHARMACY (OUTPATIENT)
Dept: PHARMACY | Facility: CLINIC | Age: 69
End: 2024-04-29

## 2024-05-07 ENCOUNTER — LAB (OUTPATIENT)
Dept: LAB | Facility: LAB | Age: 69
End: 2024-05-07
Payer: MEDICARE

## 2024-05-07 DIAGNOSIS — Z94.0 KIDNEY REPLACED BY TRANSPLANT (HHS-HCC): ICD-10-CM

## 2024-05-07 DIAGNOSIS — E03.9 HYPOTHYROIDISM, UNSPECIFIED: Primary | ICD-10-CM

## 2024-05-07 DIAGNOSIS — E55.9 VITAMIN D2 DEFICIENCY: ICD-10-CM

## 2024-05-07 DIAGNOSIS — E03.9 HYPOTHYROIDISM, UNSPECIFIED: ICD-10-CM

## 2024-05-07 LAB
25(OH)D3 SERPL-MCNC: 48 NG/ML (ref 30–100)
ALBUMIN SERPL BCP-MCNC: 3.7 G/DL (ref 3.4–5)
ANION GAP SERPL CALC-SCNC: 15 MMOL/L (ref 10–20)
BUN SERPL-MCNC: 27 MG/DL (ref 6–23)
CALCIUM SERPL-MCNC: 9.6 MG/DL (ref 8.6–10.6)
CHLORIDE SERPL-SCNC: 107 MMOL/L (ref 98–107)
CO2 SERPL-SCNC: 22 MMOL/L (ref 21–32)
CREAT SERPL-MCNC: 0.82 MG/DL (ref 0.5–1.05)
CRP SERPL-MCNC: 0.21 MG/DL
EGFRCR SERPLBLD CKD-EPI 2021: 79 ML/MIN/1.73M*2
ERYTHROCYTE [DISTWIDTH] IN BLOOD BY AUTOMATED COUNT: 12.9 % (ref 11.5–14.5)
GLUCOSE SERPL-MCNC: 97 MG/DL (ref 74–99)
HCT VFR BLD AUTO: 39.6 % (ref 36–46)
HGB BLD-MCNC: 12.6 G/DL (ref 12–16)
MCH RBC QN AUTO: 28.8 PG (ref 26–34)
MCHC RBC AUTO-ENTMCNC: 31.8 G/DL (ref 32–36)
MCV RBC AUTO: 91 FL (ref 80–100)
NRBC BLD-RTO: 0 /100 WBCS (ref 0–0)
PHOSPHATE SERPL-MCNC: 3.5 MG/DL (ref 2.5–4.9)
PLATELET # BLD AUTO: 157 X10*3/UL (ref 150–450)
POTASSIUM SERPL-SCNC: 4.5 MMOL/L (ref 3.5–5.3)
PTH-INTACT SERPL-MCNC: 66.1 PG/ML (ref 18.5–88)
RBC # BLD AUTO: 4.37 X10*6/UL (ref 4–5.2)
SODIUM SERPL-SCNC: 139 MMOL/L (ref 136–145)
T3FREE SERPL-MCNC: 3.1 PG/ML (ref 2.3–4.2)
T4 FREE SERPL-MCNC: 1.1 NG/DL (ref 0.78–1.48)
TACROLIMUS BLD-MCNC: 16.8 NG/ML
THYROPEROXIDASE AB SERPL-ACNC: 48 IU/ML
TSH SERPL-ACNC: 1.4 MIU/L (ref 0.44–3.98)
WBC # BLD AUTO: 3.8 X10*3/UL (ref 4.4–11.3)

## 2024-05-07 PROCEDURE — 82306 VITAMIN D 25 HYDROXY: CPT

## 2024-05-07 PROCEDURE — 85027 COMPLETE CBC AUTOMATED: CPT

## 2024-05-07 PROCEDURE — 86140 C-REACTIVE PROTEIN: CPT

## 2024-05-07 PROCEDURE — 86376 MICROSOMAL ANTIBODY EACH: CPT

## 2024-05-07 PROCEDURE — 36415 COLL VENOUS BLD VENIPUNCTURE: CPT

## 2024-05-07 PROCEDURE — 80069 RENAL FUNCTION PANEL: CPT

## 2024-05-07 PROCEDURE — 84443 ASSAY THYROID STIM HORMONE: CPT

## 2024-05-07 PROCEDURE — 83970 ASSAY OF PARATHORMONE: CPT

## 2024-05-07 PROCEDURE — 84439 ASSAY OF FREE THYROXINE: CPT

## 2024-05-07 PROCEDURE — 80197 ASSAY OF TACROLIMUS: CPT

## 2024-05-07 PROCEDURE — 84481 FREE ASSAY (FT-3): CPT

## 2024-05-08 ENCOUNTER — TELEPHONE (OUTPATIENT)
Dept: TRANSPLANT | Facility: HOSPITAL | Age: 69
End: 2024-05-08
Payer: MEDICARE

## 2024-05-08 DIAGNOSIS — Z94.0 KIDNEY REPLACED BY TRANSPLANT (HHS-HCC): ICD-10-CM

## 2024-05-08 NOTE — TELEPHONE ENCOUNTER
Reviewed with Dr. Cerda:     Tac; (16.8 on 5/7); (4.7); (5.4)  Previous night too medication between 8:30-9pm. Did not take morning dose.    She is currently taking 1 mg BID. No reports of any diarrhea or gi symptoms.     Plan: Repeat tac level tomorrow.     Spoke with the patient. She will repeat another tac level tomorrow.

## 2024-05-09 ENCOUNTER — LAB (OUTPATIENT)
Dept: LAB | Facility: LAB | Age: 69
End: 2024-05-09
Payer: MEDICARE

## 2024-05-09 DIAGNOSIS — Z94.0 KIDNEY REPLACED BY TRANSPLANT (HHS-HCC): ICD-10-CM

## 2024-05-09 LAB — TACROLIMUS BLD-MCNC: 4.6 NG/ML

## 2024-05-09 PROCEDURE — 80197 ASSAY OF TACROLIMUS: CPT

## 2024-05-09 PROCEDURE — 36415 COLL VENOUS BLD VENIPUNCTURE: CPT

## 2024-05-28 ENCOUNTER — SPECIALTY PHARMACY (OUTPATIENT)
Dept: PHARMACY | Facility: CLINIC | Age: 69
End: 2024-05-28

## 2024-05-28 PROCEDURE — RXMED WILLOW AMBULATORY MEDICATION CHARGE

## 2024-05-30 ENCOUNTER — PHARMACY VISIT (OUTPATIENT)
Dept: PHARMACY | Facility: CLINIC | Age: 69
End: 2024-05-30
Payer: COMMERCIAL

## 2024-06-24 ENCOUNTER — SPECIALTY PHARMACY (OUTPATIENT)
Dept: PHARMACY | Facility: CLINIC | Age: 69
End: 2024-06-24

## 2024-06-24 PROCEDURE — RXMED WILLOW AMBULATORY MEDICATION CHARGE

## 2024-06-25 ENCOUNTER — PHARMACY VISIT (OUTPATIENT)
Dept: PHARMACY | Facility: CLINIC | Age: 69
End: 2024-06-25
Payer: COMMERCIAL

## 2024-07-12 PROBLEM — D09.9 SQUAMOUS CELL CARCINOMA IN SITU (SCCIS): Status: ACTIVE | Noted: 2017-10-12

## 2024-07-12 PROBLEM — L81.4 SOLAR LENTIGO: Status: ACTIVE | Noted: 2024-01-11

## 2024-07-12 PROBLEM — C43.9 MELANOMA (MULTI): Status: ACTIVE | Noted: 2020-10-14

## 2024-07-15 ENCOUNTER — APPOINTMENT (OUTPATIENT)
Dept: DERMATOLOGY | Facility: CLINIC | Age: 69
End: 2024-07-15
Payer: MEDICARE

## 2024-07-15 DIAGNOSIS — L82.1 SEBORRHEIC KERATOSIS: ICD-10-CM

## 2024-07-15 DIAGNOSIS — L81.4 LENTIGO: ICD-10-CM

## 2024-07-15 DIAGNOSIS — L90.5 SCAR CONDITIONS AND FIBROSIS OF SKIN: Primary | ICD-10-CM

## 2024-07-15 DIAGNOSIS — L57.0 ACTINIC KERATOSIS: ICD-10-CM

## 2024-07-15 DIAGNOSIS — Z85.820 PERSONAL HISTORY OF MALIGNANT MELANOMA OF SKIN: ICD-10-CM

## 2024-07-15 DIAGNOSIS — D18.01 HEMANGIOMA OF SKIN: ICD-10-CM

## 2024-07-15 DIAGNOSIS — D22.9 MULTIPLE BENIGN NEVI: ICD-10-CM

## 2024-07-15 PROCEDURE — 1159F MED LIST DOCD IN RCRD: CPT | Performed by: DERMATOLOGY

## 2024-07-15 PROCEDURE — 99213 OFFICE O/P EST LOW 20 MIN: CPT | Performed by: DERMATOLOGY

## 2024-07-15 PROCEDURE — 17003 DESTRUCT PREMALG LES 2-14: CPT | Performed by: DERMATOLOGY

## 2024-07-15 PROCEDURE — 17000 DESTRUCT PREMALG LESION: CPT | Performed by: DERMATOLOGY

## 2024-07-15 NOTE — PROGRESS NOTES
Subjective     Cady Neal is a 68 y.o. female who presents for the following: Skin Check (6 months).     Skin Cancer Screening  She has a history of heavy sun exposure. She is in the sun frequently. She uses sunscreen frequently. She reports no skin symptoms. Her moles are not changing.    Spots that concern her: none    Patient has a history of:   SCCis  - 10/12/17  Kidney Transplant  AK  MM - 10/14/20  SK    Review of Systems:  No other skin or systemic complaints other than what is documented elsewhere in the note.    The following portions of the chart were reviewed this encounter and updated as appropriate:       Skin Cancer History  No skin cancer on file.    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    Past Surgical History:  Cady Neal  has a past surgical history that includes Kidney surgery (2013);  section, classic (2014); Foot surgery (2014); and MR angio head wo IV contrast (2012).    Family History:  Patient family history includes Skin cancer in her brother.       Objective   Well appearing patient in no apparent distress; mood and affect are within normal limits.    A full examination was performed including scalp, head, eyes, ears, nose, lips, neck, chest, axillae, abdomen, back, buttocks, bilateral upper extremities, bilateral lower extremities, hands, feet, fingers, toes, fingernails, and toenails. All findings within normal limits  unless otherwise noted below.    Assessment/Plan   1. Actinic keratosis (7)  Left Nasal Sidewall (2), Mid Forehead (4), Right Temple  Destruction of Actinic Keratosis Procedure Note  Diagnosis: AK  Location: see physical exam  Informed consent: Discussed risks (permanent scarring, light or dark discoloration, infection, pain, bleeding, bruising, redness, blister formation, and recurrence of the lesion) and benefits of the procedure, as well as the alternatives.  Informed consent was obtained.  Anesthesia: not required  Procedure Details:  The lesion was destroyed with liquid nitrogen. The patient tolerated procedure well.  Total number of lesions treated:  7  Plan:  The patient was instructed on post-op care. Recommend OTC analgesia as needed for pain.      Any spots that do not resolve with lN2, pt going to use Efudex that she has at home on them BID x 2 weeks in October. Risks, benefits, side effects, alternatives and options were discussed with patient and the patient voiced understanding. Pt agrees with plan as above.       Destr of lesion - Left Nasal Sidewall (2), Mid Forehead (4), Right Temple  Complexity: simple    Destruction method: cryotherapy    Informed consent: discussed and consent obtained    Lesion destroyed using liquid nitrogen: Yes    Cryotherapy cycles:  1  Outcome: patient tolerated procedure well with no complications    Post-procedure details: wound care instructions given      2. Scar conditions and fibrosis of skin  Scar is well-healed without evidence of recurrence    3. Seborrheic keratosis  Stuck on verrucous, tan-brown papules and plaques.      - Discussed benign nature and that no treatment is necessary unless it becomes painful or increases in size. Patient opts for clinical monitoring at this time.     4. Lentigo  Scattered tan macules in sun-exposed areas.    - Discussed benign nature and that no treatment is necessary unless it becomes painful or increases in size. Patient opts for  clinical monitoring at this time.     5. Hemangioma of skin  Scattered cherry-red papule(s).    - Discussed benign nature and that no treatment is necessary unless it becomes painful or increases in size. Patient opts for clinical monitoring at this time.     6. Multiple benign nevi  Scattered, uniform and benign-appearing, regular brown melanocytic papules and macules.    - Discussed benign nature and that no treatment is necessary unless it becomes painful or increases in size. Patient opts for clinical monitoring at this time.        Follow up 6 months or sooner as needed.

## 2024-07-23 ENCOUNTER — SPECIALTY PHARMACY (OUTPATIENT)
Dept: PHARMACY | Facility: CLINIC | Age: 69
End: 2024-07-23

## 2024-07-23 PROCEDURE — RXMED WILLOW AMBULATORY MEDICATION CHARGE

## 2024-07-24 ENCOUNTER — PHARMACY VISIT (OUTPATIENT)
Dept: PHARMACY | Facility: CLINIC | Age: 69
End: 2024-07-24
Payer: COMMERCIAL

## 2024-07-25 ENCOUNTER — SPECIALTY PHARMACY (OUTPATIENT)
Dept: PHARMACY | Facility: CLINIC | Age: 69
End: 2024-07-25

## 2024-08-02 ENCOUNTER — LAB (OUTPATIENT)
Dept: LAB | Facility: LAB | Age: 69
End: 2024-08-02
Payer: MEDICARE

## 2024-08-02 DIAGNOSIS — Z94.0 KIDNEY REPLACED BY TRANSPLANT (HHS-HCC): ICD-10-CM

## 2024-08-02 DIAGNOSIS — E55.9 VITAMIN D2 DEFICIENCY: ICD-10-CM

## 2024-08-02 LAB
25(OH)D3 SERPL-MCNC: 44 NG/ML (ref 30–100)
ALBUMIN SERPL BCP-MCNC: 3.8 G/DL (ref 3.4–5)
ANION GAP SERPL CALC-SCNC: 13 MMOL/L (ref 10–20)
BUN SERPL-MCNC: 26 MG/DL (ref 6–23)
CALCIUM SERPL-MCNC: 9.9 MG/DL (ref 8.6–10.6)
CHLORIDE SERPL-SCNC: 106 MMOL/L (ref 98–107)
CO2 SERPL-SCNC: 25 MMOL/L (ref 21–32)
CREAT SERPL-MCNC: 0.84 MG/DL (ref 0.5–1.05)
EGFRCR SERPLBLD CKD-EPI 2021: 77 ML/MIN/1.73M*2
ERYTHROCYTE [DISTWIDTH] IN BLOOD BY AUTOMATED COUNT: 12.9 % (ref 11.5–14.5)
GLUCOSE SERPL-MCNC: 103 MG/DL (ref 74–99)
HCT VFR BLD AUTO: 40.5 % (ref 36–46)
HGB BLD-MCNC: 13.2 G/DL (ref 12–16)
MCH RBC QN AUTO: 29.3 PG (ref 26–34)
MCHC RBC AUTO-ENTMCNC: 32.6 G/DL (ref 32–36)
MCV RBC AUTO: 90 FL (ref 80–100)
NRBC BLD-RTO: 0 /100 WBCS (ref 0–0)
PHOSPHATE SERPL-MCNC: 4 MG/DL (ref 2.5–4.9)
PLATELET # BLD AUTO: 177 X10*3/UL (ref 150–450)
POTASSIUM SERPL-SCNC: 4.4 MMOL/L (ref 3.5–5.3)
PTH-INTACT SERPL-MCNC: 80.5 PG/ML (ref 18.5–88)
RBC # BLD AUTO: 4.5 X10*6/UL (ref 4–5.2)
SODIUM SERPL-SCNC: 140 MMOL/L (ref 136–145)
TACROLIMUS BLD-MCNC: 3.5 NG/ML
WBC # BLD AUTO: 4.4 X10*3/UL (ref 4.4–11.3)

## 2024-08-02 PROCEDURE — 36415 COLL VENOUS BLD VENIPUNCTURE: CPT

## 2024-08-02 PROCEDURE — 80197 ASSAY OF TACROLIMUS: CPT

## 2024-08-02 PROCEDURE — 85027 COMPLETE CBC AUTOMATED: CPT

## 2024-08-02 PROCEDURE — 82306 VITAMIN D 25 HYDROXY: CPT

## 2024-08-02 PROCEDURE — 83970 ASSAY OF PARATHORMONE: CPT

## 2024-08-02 PROCEDURE — 80069 RENAL FUNCTION PANEL: CPT

## 2024-08-13 DIAGNOSIS — N39.0 RECURRENT UTI: ICD-10-CM

## 2024-08-27 ENCOUNTER — SPECIALTY PHARMACY (OUTPATIENT)
Dept: PHARMACY | Facility: CLINIC | Age: 69
End: 2024-08-27

## 2024-08-27 PROCEDURE — RXMED WILLOW AMBULATORY MEDICATION CHARGE

## 2024-08-28 ENCOUNTER — PHARMACY VISIT (OUTPATIENT)
Dept: PHARMACY | Facility: CLINIC | Age: 69
End: 2024-08-28
Payer: COMMERCIAL

## 2024-09-24 ENCOUNTER — SPECIALTY PHARMACY (OUTPATIENT)
Dept: PHARMACY | Facility: CLINIC | Age: 69
End: 2024-09-24

## 2024-09-24 ENCOUNTER — TELEPHONE (OUTPATIENT)
Dept: GASTROENTEROLOGY | Facility: HOSPITAL | Age: 69
End: 2024-09-24
Payer: MEDICARE

## 2024-09-24 DIAGNOSIS — E55.9 VITAMIN D2 DEFICIENCY: ICD-10-CM

## 2024-09-24 DIAGNOSIS — Z94.0 KIDNEY REPLACED BY TRANSPLANT (HHS-HCC): ICD-10-CM

## 2024-09-24 PROCEDURE — RXMED WILLOW AMBULATORY MEDICATION CHARGE

## 2024-09-25 DIAGNOSIS — Z12.11 COLON CANCER SCREENING: Primary | ICD-10-CM

## 2024-09-26 ENCOUNTER — PHARMACY VISIT (OUTPATIENT)
Dept: PHARMACY | Facility: CLINIC | Age: 69
End: 2024-09-26
Payer: COMMERCIAL

## 2024-10-22 ENCOUNTER — SPECIALTY PHARMACY (OUTPATIENT)
Dept: PHARMACY | Facility: CLINIC | Age: 69
End: 2024-10-22

## 2024-10-22 PROCEDURE — RXMED WILLOW AMBULATORY MEDICATION CHARGE

## 2024-10-23 ENCOUNTER — APPOINTMENT (OUTPATIENT)
Dept: DERMATOLOGY | Facility: CLINIC | Age: 69
End: 2024-10-23
Payer: MEDICARE

## 2024-10-23 DIAGNOSIS — D48.5 NEOPLASM OF UNCERTAIN BEHAVIOR OF SKIN: Primary | ICD-10-CM

## 2024-10-23 PROCEDURE — 1036F TOBACCO NON-USER: CPT | Performed by: DERMATOLOGY

## 2024-10-23 PROCEDURE — 1159F MED LIST DOCD IN RCRD: CPT | Performed by: DERMATOLOGY

## 2024-10-23 PROCEDURE — 99213 OFFICE O/P EST LOW 20 MIN: CPT | Performed by: DERMATOLOGY

## 2024-10-23 NOTE — PROGRESS NOTES
Subjective     Cady Neal is a 68 y.o. female who presents for the following: Suspicious Skin Lesion (3 Month. Hx of MM - 10/14/20, SCCIs - 10/12/17, and AK. Kidney Transplant Pt. /Reports LV in July this area was treated via Cryo, not noted in visit anything on left cheek treated ).     Skin Cancer Screening  Hx of MM - 10/14/20, SCCIs - 10/12/17, and AK.   Kidney Transplant Pt.     Review of Systems:  No other skin or systemic complaints other than what is documented elsewhere in the note.    The following portions of the chart were reviewed this encounter and updated as appropriate:       Skin Cancer History  No skin cancer on file.    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    Past Surgical History:  Cady Neal  has a past surgical history that includes Kidney surgery (2013);  section, classic (2014); Foot surgery (2014); and MR angio head wo IV contrast (2012).    Family History:  Patient family history includes Skin cancer in her brother.       Objective   Well appearing patient in no apparent distress; mood and affect are within normal limits.    A full examination was performed including scalp, head, eyes, ears, nose, lips, neck, chest, axillae, abdomen, back, buttocks, bilateral upper extremities, bilateral lower extremities, hands, feet, fingers, toes, fingernails, and toenails. All findings within normal limits unless otherwise noted  below.    Assessment/Plan   1. Neoplasm of uncertain behavior of skin  Left Buccal Cheek  Scaly erythematous papule    Lesion biopsy  Type of biopsy: tangential    Informed consent: discussed and consent obtained    Timeout: patient name, date of birth, surgical site, and procedure verified    Procedure prep:  Patient was prepped and draped  Anesthesia: the lesion was anesthetized in a standard fashion    Anesthetic:  1% lidocaine w/ epinephrine 1-100,000 local infiltration  Instrument used: DermaBlade    Hemostasis achieved with: aluminum chloride    Outcome: patient tolerated procedure well    Post-procedure details: sterile dressing applied and wound care instructions given    Dressing type: petrolatum and bandage      Staff Communication: Dermatology Local Anesthesia: 1 % Lidocaine / Epinephrine - Amount: 1 mL    Specimen 1 - Dermatopathology- DERM LAB  Differential Diagnosis: r/o HAK vs SCC  Check Margins Yes/No?:    Comments:    Dermpath Lab: Routine Histopathology (formalin-fixed tissue)       Follow up pending biopsy results.

## 2024-10-24 ENCOUNTER — PHARMACY VISIT (OUTPATIENT)
Dept: PHARMACY | Facility: CLINIC | Age: 69
End: 2024-10-24
Payer: COMMERCIAL

## 2024-10-29 DIAGNOSIS — C44.320 SQUAMOUS CELL CARCINOMA OF SKIN OF FACE: Primary | ICD-10-CM

## 2024-10-29 NOTE — RESULT ENCOUNTER NOTE
Spoke to patient, informed of results. Has had Mohs in the past, denies any questions. Referral placed.

## 2024-11-20 ENCOUNTER — SPECIALTY PHARMACY (OUTPATIENT)
Dept: PHARMACY | Facility: CLINIC | Age: 69
End: 2024-11-20

## 2024-11-20 PROCEDURE — RXMED WILLOW AMBULATORY MEDICATION CHARGE

## 2024-11-26 ENCOUNTER — PHARMACY VISIT (OUTPATIENT)
Dept: PHARMACY | Facility: CLINIC | Age: 69
End: 2024-11-26
Payer: COMMERCIAL

## 2024-12-12 ENCOUNTER — EVALUATION (OUTPATIENT)
Dept: OCCUPATIONAL THERAPY | Facility: CLINIC | Age: 69
End: 2024-12-12
Payer: MEDICARE

## 2024-12-12 DIAGNOSIS — I89.0 LYMPHEDEMA: ICD-10-CM

## 2024-12-12 DIAGNOSIS — M25.671 ANKLE STIFFNESS, RIGHT: ICD-10-CM

## 2024-12-12 DIAGNOSIS — M79.604 LOWER EXTREMITY PAIN, RIGHT: ICD-10-CM

## 2024-12-12 DIAGNOSIS — M62.81 MUSCLE WEAKNESS (GENERALIZED): ICD-10-CM

## 2024-12-12 DIAGNOSIS — I89.0 LYMPHEDEMA OF RIGHT LOWER EXTREMITY: Primary | ICD-10-CM

## 2024-12-12 PROCEDURE — 97535 SELF CARE MNGMENT TRAINING: CPT | Mod: GO

## 2024-12-12 PROCEDURE — 97166 OT EVAL MOD COMPLEX 45 MIN: CPT | Mod: GO

## 2024-12-12 ASSESSMENT — ENCOUNTER SYMPTOMS
DEPRESSION: 0
OCCASIONAL FEELINGS OF UNSTEADINESS: 0
LOSS OF SENSATION IN FEET: 0

## 2024-12-12 ASSESSMENT — ACTIVITIES OF DAILY LIVING (ADL): HOME_MANAGEMENT_TIME_ENTRY: 10

## 2024-12-12 NOTE — PROGRESS NOTES
Occupational Therapy    Occupational Therapy Evaluation    Name: Cady Neal  MRN: [id number]  : 1955  Date: 24  Visit #1    Time Entry:  Time Calculation  Start Time: 805  Stop Time: 900  Time Calculation (min): 55 min  OT Evaluation Time Entry  OT Evaluation (Moderate) Time Entry: 45     OT Therapeutic Procedures Time Entry  Self Care/Home Management (ADLs) Time Entry: 10                Assessment:   Pt presents to occupational therapy today, accompanied by her ,  with right leg lymphedema (monitor left leg), leg tightness/achiness/throbbing, joint stiffness, weakness, impacting  functional mobility, ADLS, IADLS, and leisure activities.   Standardized testing and measures administered today, including LLIS, reveal that the patient has multiple impairments in body structures and functions, activity limitations, and participation restrictions.   The patient has personal factors and comorbidities that may serve as barriers affecting the plan of care, including h/o swelling, impact on mobility, busy schedule.  Skilled OT services are warranted in order to realize measurable change in the above outcome measures and achieve improvements in the patient's functional status and individual goals. The patient verbalized understanding and is in agreement with all goals and plan of care.    Clinical Presentation: evolving with changing characteristics   Level of Complexity: moderate   Problems To Be Addressed: decreased ADL performance, decreased IADL performance, decreased play/leisure performance, decreased knowledge of precautions, decreased knowledge of HEP, edema, pain, decreased ROM/joint mobility, decreased strength, impaired sensation/sensibility and lymphedema.       Plan:   By discharge pt will achieve the following goals:     -Demonstrate decreased swelling and softened tissue texture in RLE, monitor LLE, upon visual inspection and palpation to increase safe functional mobility, ADLS.  IADLS, and leisure activities.  -Decrease circumferential measurements right lower extremity by 0.5-4.0 cm.  -Demonstrate increased knowledge with lymphedema skin care precautions to reduce the risk of infection and exacerbation.  -Demonstrate independence with the self manual lymph drainage (MLD) to enhance lymphatic flow and decongestion of trunk, right/both lower extremities.  Assess lymphedema pump use.  -Demonstrate independence with self bandaging/compression techniques and independent understanding of the principles and theory of lymphatic compression.  -Be fit with and demonstrate good tolerance to right lower extremity compression garment (to determine style, mmHg) when the patient is stable, at maximal decongestion.  -Demonstrate independence with donning/doffing garment and adherence to wear schedule, adaptive techniques as needed.  -Improve LLIS score by 7-14 points by discharge.  -Decrease pain, tightness lower extremities.  -Improve right LE functional ROM and strength as needed for safe functional mobility.  -Demonstrate independence with home exercise program and compliance with lymphedema exercise precautions.      Intervention plan include: vasopneumatic device , ADLs, compression bandaging , edema control , education/instruction , garment measuring/fitting , home program , IADLs, manual lymphatic drainage , manual therapy , therapeutic activities and therapeutic exercises.   Frequency and duration: 1-2 time(s) a week, for 8-12 weeks.   Potential to achieve rehab goals is good.     Plan of care was developed with input and agreement by the patient.     Subjective   Current Problem:  1. Lymphedema of right lower extremity        2. Lymphedema  Referral to Occupational Therapy      3. Lower extremity pain, right        4. Ankle stiffness, right        5. Muscle weakness (generalized)          General:    Lymphedema History   History of present illness. Pt presents to OT with right leg lymphedema  "(monitor left leg), leg tightness/achiness/throbbing, joint stiffness, weakness, impacting  functional mobility, ADLS, IADLS, and leisure activities.   Pt reports swelling worsened. Foot \"soreness\" this past July 2024.   Pt denies cellulitis. Pt not wearing compression stocking. OT to assess, will pursue when pt decongested. Pt elevates her legs daily.  Medical history: h/o right foot fractures; OA, bilateral thumb MCP joints, back pain; h/o mitral valve insufficiency; h/o CHF, stable; h/o kidney transplants, 2007, refined 8 years ago; h/o 3-c-sections.     Living Environment:   Pt lives with her  in 1-story house, laundry in basement (pt helps with carrying laundry on stairs.  Pt has 2 daughters who are nurses, on son who live in area, are supportive.  Pt walks without difficulty, slower and cautious pace.  Pt manages stairs with hand rail support, slow cautious pace, step to pattern.  Pt reports managing bathing and dressing, caution with tub transfer. OT recommends grab bars in tub.  Pt paces self with home care tasks.  Pt drives.  Pt reports she enjoys cooking, gardening, is limited now with these activities.  Pt works as a , decreased hours.    Precautions:  Precautions  STEADI Fall Risk Score (The score of 4 or more indicates an increased risk of falling): 5     Pain Assessment:  Pt reports right lower leg tight, achy, throbs.  Current= 4/10,  worst= 8/10.  Pt reports constant bilateral foot pain, 6/10.     Outcome Measures:  LLIS= 65    Objective   ROM:   R ankles tight, stiff.   Strength:   Decreased standing, walking tolerance.   Sensation:   Intact RLE, pt reports numbness.  Lower Extremity (Skin Appearance/Condition and Girth):   Dryness    Fibrotic edema right lower leg, ankle, foot   Hyperkeratosis harder crease ankle, top of toes, mild   Hyperpigmentation pink right lower leg, ankles, foot   + Stemmer Sign right 2nd toe (left negative)   Additional Information:   Firm, full tissue " texture lower legs, fullness thigh.    Lower Extremity Girth Measurements:      RLE cm  Superior border of patella (SBP) 43.3  10 cm above SBP 52.5  15 cm above SBP 55.8  10cm below SBP 38.6  20cm below SBP 41.9  30cm below SBP 33.1  35cm below SBP 29.0  Ankle lobule 28.8  Ankle 26.1  Forefoot 21.6    LLE cm  Superior border of patella (SBP) 40.1  10 cm above SBP 48.8  15 cm above SBP 53.1  10cm below SBP 34.2  20cm below SBP 38.6  30cm below SBP 30.7  35cm below SBP 26.8  Ankle lobule 24.6  Ankle 23.2  Forefoot 20.3       Treatment 55 minutes    Evaluation 45    Self Care 10  Pt instructed on function of lymphatic system, causes of lymphedema, lymphedema tx/CDT-complete decongestive therapy which includes skin care, manual lymph drainage (MLD), compression and exercise. NLN hand out issued.

## 2024-12-13 ENCOUNTER — TELEPHONE (OUTPATIENT)
Dept: GASTROENTEROLOGY | Facility: HOSPITAL | Age: 69
End: 2024-12-13
Payer: MEDICARE

## 2024-12-19 ENCOUNTER — TREATMENT (OUTPATIENT)
Dept: OCCUPATIONAL THERAPY | Facility: CLINIC | Age: 69
End: 2024-12-19
Payer: MEDICARE

## 2024-12-19 DIAGNOSIS — M79.604 LOWER EXTREMITY PAIN, RIGHT: ICD-10-CM

## 2024-12-19 DIAGNOSIS — M62.81 MUSCLE WEAKNESS (GENERALIZED): ICD-10-CM

## 2024-12-19 DIAGNOSIS — I89.0 LYMPHEDEMA OF RIGHT LOWER EXTREMITY: Primary | ICD-10-CM

## 2024-12-19 DIAGNOSIS — M25.671 ANKLE STIFFNESS, RIGHT: ICD-10-CM

## 2024-12-19 PROCEDURE — 97140 MANUAL THERAPY 1/> REGIONS: CPT | Mod: GO

## 2024-12-19 PROCEDURE — 97535 SELF CARE MNGMENT TRAINING: CPT | Mod: GO

## 2024-12-19 ASSESSMENT — ACTIVITIES OF DAILY LIVING (ADL): HOME_MANAGEMENT_TIME_ENTRY: 30

## 2024-12-19 NOTE — PROGRESS NOTES
Occupational Therapy    Occupational Therapy Treatment    Name: Cady Neal  MRN: [id number]  : 1955  Date: 24  Visit #2    Time Entry:  Time Calculation  Start Time: 1402  Stop Time: 1457  Time Calculation (min): 55 min        OT Therapeutic Procedures Time Entry  Manual Therapy Time Entry: 25  Self Care/Home Management (ADLs) Time Entry: 30                Assessment:   OT develop manual lymph drainage (MLD) sequence, provide tx.  Pt tolerate tx well, right leg softening noted post tx.  Pt instructed in techniques, issued HEP.  Instruction to pt and her .    Plan:   Continue OT  for trunk, RLE lymphedema CDT (complete decongestive therapy), decrease swelling, improve tissue texture, improve mobility and ease of functional mobility, ADLS, IADLS.      Subjective   General:   Pt notes more swelling, reports shoe is tighter.     Precautions:  STEADI Fall Risk Score (The score of 4 or more indicates an increased risk of falling): 5  Pt has h/o kidney transplant.    Pain Assessment:   Pt reports right lower leg tight, achy, throbs.   Pt reports constant bilateral foot pain, improving with orthopedic shoes.  Constant low/mid back pain.    Objective    ROM:   R ankles tight, stiff.   Strength:   Decreased standing, walking tolerance.   Sensation:   Intact RLE, pt reports numbness.  Lower Extremity (Skin Appearance/Condition and Girth):   Dryness    Fibrotic edema right lower leg, ankle, foot   Hyperkeratosis harder crease ankle, top of toes, mild   Hyperpigmentation pink right lower leg, ankles, foot   + Stemmer Sign right 2nd toe (left negative)   Additional Information:   Firm, full tissue texture lower legs, fullness thigh.    Lower Extremity Girth Measurements:      Measurements not taken.    Treatment:   55 minutes    Manual Therapy 25  OT develop MLD sequence: modified trunk- clear abdomen, both axilla, both inguinals, MLD R leg.   OT provide tx, demonstrate, instruct hand techniques.  R leg  tissue texture softening noted post tx.  Pt reported that her leg felt softer, lighter.    Self Care 30  OT assess skin, tissue texture.  Precaution: pt h/o kidney transplant.  OT educate pt and her  on theory of MLD, develop sequence.  OT instruct hand techniques: direction, pressure.  Good initial pt teach back.  OT develop written HEP with pt input, issue to pt.  OT issue HEP for abdominal breathing techniques (sep one of MLD).  Pt to start 1-2x/day.  Will assess, upgrade.

## 2024-12-23 PROCEDURE — RXMED WILLOW AMBULATORY MEDICATION CHARGE

## 2024-12-26 ENCOUNTER — SPECIALTY PHARMACY (OUTPATIENT)
Dept: PHARMACY | Facility: CLINIC | Age: 69
End: 2024-12-26

## 2024-12-26 ENCOUNTER — TREATMENT (OUTPATIENT)
Dept: OCCUPATIONAL THERAPY | Facility: CLINIC | Age: 69
End: 2024-12-26
Payer: MEDICARE

## 2024-12-26 DIAGNOSIS — I89.0 LYMPHEDEMA OF RIGHT LOWER EXTREMITY: ICD-10-CM

## 2024-12-26 DIAGNOSIS — M79.604 LOWER EXTREMITY PAIN, RIGHT: ICD-10-CM

## 2024-12-26 DIAGNOSIS — M25.671 ANKLE STIFFNESS, RIGHT: ICD-10-CM

## 2024-12-26 DIAGNOSIS — M62.81 MUSCLE WEAKNESS (GENERALIZED): Primary | ICD-10-CM

## 2024-12-26 PROCEDURE — 97535 SELF CARE MNGMENT TRAINING: CPT | Mod: GO | Performed by: OCCUPATIONAL THERAPIST

## 2024-12-26 PROCEDURE — 97140 MANUAL THERAPY 1/> REGIONS: CPT | Mod: GO | Performed by: OCCUPATIONAL THERAPIST

## 2024-12-26 ASSESSMENT — ACTIVITIES OF DAILY LIVING (ADL): HOME_MANAGEMENT_TIME_ENTRY: 26

## 2024-12-26 NOTE — PROGRESS NOTES
Occupational Therapy    Occupational Therapy Treatment    Name: Cady Neal  MRN: [id number]  : 1955  Date: 24    Visit #3    Time Entry:        1. Muscle weakness (generalized)        2. Lymphedema of right lower extremity        3. Lower extremity pain, right        4. Ankle stiffness, right                 Assessment:  OT initiated use of short stretch bandages for RLE.  OT educated pt and spouse on donning techniques of bandages.  Pt and spouse expressed carryover of education provided.  Will further assess     Plan:   Continue OT  for trunk, RLE lymphedema CDT (complete decongestive therapy), decrease swelling, improve tissue texture, improve mobility and ease of functional mobility, ADLS, IADLS.      Subjective   General:  Pt reports she has been completing her self manual lymph drainage program 2-3x/daily and she has noticed her shoes are fitting better.      Precautions:  TAINAADI Fall Risk Score (The score of 4 or more indicates an increased risk of falling): 5  Pt has h/o kidney transplant.    Pain Assessment:   Pt reports right lower leg tight, achy, throbs.   Pt reports constant bilateral foot pain, improving with orthopedic shoes.  Constant low/mid back pain.    Objective    ROM:   R ankles tight, stiff.   Strength:   Decreased standing, walking tolerance.   Sensation:   Intact RLE, pt reports numbness.  Lower Extremity (Skin Appearance/Condition and Girth):   Dryness    Fibrotic edema right lower leg, ankle, foot   Hyperkeratosis harder crease ankle, top of toes, mild   Hyperpigmentation pink right lower leg, ankles, foot   + Stemmer Sign right 2nd toe (left negative)   Additional Information:   Firm, full tissue texture lower legs, fullness thigh.    Lower Extremity Girth Measurements:      Measurements not taken.    Treatment:   58 minutes    Manual Therapy 32  Pt requests further clarification on manual lymph drainage (MLD) sequence.  OT provided manual lymph drainage (MLD) to R  trunk and RLE; while OT completed MLD, OT reviewed sequencing and hand techniques with patient from MLD home program. Pt expressed increased understanding.    Self Care 26    OT assessed skin     OT applied short stretch bandages to RLE from foot to knee:  -4” stockinette base layer, 1-8cm and 1-10cm short stretch bandage. - Comfortable fit achieved.  OT educated pt and on donning techniques and wear schedule of bandages.  OT reviewed post bandaging precautions, handout issued. -Pt expressed teach back of education

## 2024-12-27 ENCOUNTER — PHARMACY VISIT (OUTPATIENT)
Dept: PHARMACY | Facility: CLINIC | Age: 69
End: 2024-12-27
Payer: COMMERCIAL

## 2025-01-02 ENCOUNTER — TREATMENT (OUTPATIENT)
Dept: OCCUPATIONAL THERAPY | Facility: CLINIC | Age: 70
End: 2025-01-02
Payer: MEDICARE

## 2025-01-02 DIAGNOSIS — M25.671 ANKLE STIFFNESS, RIGHT: ICD-10-CM

## 2025-01-02 DIAGNOSIS — M79.604 LOWER EXTREMITY PAIN, RIGHT: ICD-10-CM

## 2025-01-02 DIAGNOSIS — I89.0 LYMPHEDEMA OF RIGHT LOWER EXTREMITY: Primary | ICD-10-CM

## 2025-01-02 DIAGNOSIS — M62.81 MUSCLE WEAKNESS (GENERALIZED): ICD-10-CM

## 2025-01-02 PROCEDURE — 97140 MANUAL THERAPY 1/> REGIONS: CPT | Mod: GO,CO

## 2025-01-02 PROCEDURE — 97535 SELF CARE MNGMENT TRAINING: CPT | Mod: GO,CO

## 2025-01-02 ASSESSMENT — ACTIVITIES OF DAILY LIVING (ADL): HOME_MANAGEMENT_TIME_ENTRY: 38

## 2025-01-02 NOTE — PROGRESS NOTES
Occupational Therapy    Occupational Therapy Treatment    Name: Cady Neal  MRN: [id number]  : 1955  Date: 25    Visit #4    Time Entry:  Time Calculation  Start Time: 08  Stop Time: 09  Time Calculation (min): 58 min     1. Lymphedema of right lower extremity        2. Muscle weakness (generalized)        3. Ankle stiffness, right        4. Lower extremity pain, right                 Assessment:  Pt appears to tolerate treatment well with no complaints of pain. Good tissue texture softening post MLD. Decreased swelling B LE this date. Pt would benefit from lymphatic pump for home use to promote further decreased swelling and increased ease of functional mobility tasks.    Plan:   Continue OT  for trunk, RLE lymphedema CDT (complete decongestive therapy), decrease swelling, improve tissue texture, improve mobility and ease of functional mobility, ADLS, IADLS.      Subjective   General:  Pt states she had some pain and swelling in her legs with being up on them for the holidays but after taking a day to rest yesterday they are feeling better.      Precautions:  STEADI Fall Risk Score (The score of 4 or more indicates an increased risk of falling): 5  Pt has h/o kidney transplant.    Pain Assessment:   Pt reports right lower leg tight, achy, throbs.   Pt reports constant bilateral foot pain, improving with orthopedic shoes.  Constant low/mid back pain.    Objective    ROM:   R ankles tight, stiff.   Strength:   Decreased standing, walking tolerance.   Sensation:   Intact RLE, pt reports numbness.  Lower Extremity (Skin Appearance/Condition and Girth):   Dryness    Fibrotic edema right lower leg, ankle, foot   Hyperkeratosis harder crease ankle, top of toes, mild   Hyperpigmentation pink right lower leg, ankles, foot   + Stemmer Sign right 2nd toe (left negative)   Additional Information:   Firm, full tissue texture lower legs, fullness thigh.    Lower Extremity Girth Measurements:      RLE  cm  Superior border of patella (SBP) 45.1  10 cm above SBP   15 cm above SBP   10cm below SBP 36.3 - 36.0 (post pump)  20cm below SBP 41.4  30cm below SBP 34.5- 32.8 (post pump)  35cm below SBP 28.1  Ankle lobule 28.9 -27.4 (post pump)  Ankle 25.8- 25.4 (post pump)  Forefoot 21.6     LLE cm  Superior border of patella (SBP) 39.5  10 cm above SBP   15 cm above SBP   10cm below SBP 32.2  20cm below SBP 37.9  30cm below SBP 30.9  35cm below SBP 26.4  Ankle lobule 23.9  Ankle 23.3  Forefoot 20.2    Decreases from initial evaluation  Decreases after pump use    Treatment:   58 minutes    Manual Therapy 20  ALBARRAN applies lymphapress R LE at 35mmHg, pt initiates beginning steps to MLD, ALBARRAN simultaneously provides MLD to L LE. Good tissue texture softening post MLD, pt states pump is comfortable.    Self Care 38    ALBARRAN assesses skin, take and assess measurements B LE.    ALBARRAN applied short stretch bandages to RLE from foot to knee:  -4” stockinette base layer, 1-8cm and 1-10cm short stretch bandage from foot to below knee, heel uninvolved - Comfortable fit achieved.    Home management  Pt continues wrapping with short stretch bandages  Pt elevates B LE daily  Pt continues home exercise program, manual lymph drainage daily.

## 2025-01-06 ENCOUNTER — TREATMENT (OUTPATIENT)
Dept: OCCUPATIONAL THERAPY | Facility: CLINIC | Age: 70
End: 2025-01-06
Payer: MEDICARE

## 2025-01-06 DIAGNOSIS — M62.81 MUSCLE WEAKNESS (GENERALIZED): ICD-10-CM

## 2025-01-06 DIAGNOSIS — M25.671 ANKLE STIFFNESS, RIGHT: Primary | ICD-10-CM

## 2025-01-06 DIAGNOSIS — M79.604 LOWER EXTREMITY PAIN, RIGHT: ICD-10-CM

## 2025-01-06 DIAGNOSIS — I89.0 LYMPHEDEMA OF RIGHT LOWER EXTREMITY: ICD-10-CM

## 2025-01-06 PROCEDURE — 97140 MANUAL THERAPY 1/> REGIONS: CPT | Mod: GO

## 2025-01-06 PROCEDURE — 97535 SELF CARE MNGMENT TRAINING: CPT | Mod: GO

## 2025-01-06 ASSESSMENT — ACTIVITIES OF DAILY LIVING (ADL): HOME_MANAGEMENT_TIME_ENTRY: 30

## 2025-01-06 NOTE — PROGRESS NOTES
Occupational Therapy    Occupational Therapy Treatment    Name: Cady Neal  MRN: [id number]  : 1955  Date: 25    Time Entry:  Time Calculation  Start Time: 835  Stop Time: 930  Time Calculation (min): 55 min        OT Therapeutic Procedures Time Entry  Manual Therapy Time Entry: 25  Self Care/Home Management (ADLs) Time Entry: 30                Visit #5  1. Ankle stiffness, right        2. Lower extremity pain, right        3. Lymphedema of right lower extremity        4. Muscle weakness (generalized)                   Assessment:  OT provide manual lymph drainage (MLD), use lymphedema pump, good tolerance reported.  BLE tissue texture softening post tx, right leg more firm, fibrotic compared to left leg.  Pt would benefit from pump for home management, improve mobility and ease of functional mobility, ADLS, IADLS.    Pt showing swelling despite compression, MLD.    Plan:   Continue OT  for trunk, RLE/BLE lymphedema CDT (complete decongestive therapy), decrease swelling, improve tissue texture, improve mobility and ease of functional mobility, ADLS, IADLS.      Subjective   General:  Pt wrapping daily, doing her MLD daily.  Pt voiced she wants to transition to compression stockings.     Precautions:  STEADI Fall Risk Score (The score of 4 or more indicates an increased risk of falling): 5  Pt has h/o kidney transplant.    Pain Assessment:   Pt reports right lower leg pain improved, minimal (tight, achy, throbs).   Pt notes left foot ortho pain.  Pt reports constant low/mid back pain.    Objective    ROM:   R ankles tight, stiff.   Strength:   Decreased standing, walking tolerance.   Sensation:   Intact RLE, pt reports numbness.  Lower Extremity (Skin Appearance/Condition and Girth):   Dryness    Fibrotic edema right lower leg, ankle, foot   Hyperkeratosis harder crease ankle, top of toes, mild   Hyperpigmentation pink right lower leg, ankles, foot   + Stemmer Sign right 2nd toe (left negative)    Additional Information:   Firm, full tissue texture lower legs, fullness thigh.  R>L    Lower Extremity Girth Measurements:      RLE cm  Superior border of patella (SBP) 45.4  (post pump 45.1)  10 cm above SBP 55.3  -over pants   (post pump 54.5)  15 cm above SBP 58.8  -over pants   (post pump  58.8)  10cm below SBP 37.7   (post pump 37.7)   20cm below SBP 42.4    (post pump 41.8)  30cm below SBP 34.6    (post pump 34.5)  35cm below SBP 29.3    (post pump 28.8)  Ankle lobule 29.1    (post pump 28.6)  Ankle 25.7       (post pump 25.6)  Forefoot 21.4   (post pump 21.5 )     LLE cm  Superior border of patella (SBP) 39.8  10 cm above SBP  48.8  -over pants  15 cm above SBP 54.8  -over pants  10cm below SBP 34.2  20cm below SBP 38.8  30cm below SBP 30.7  35cm below SBP 26.4  Ankle lobule 24.9  Ankle 22.7  Forefoot 19.8  Fluctuations, increases noted.  Decreases after pump use    Treatment:   55 minutes    Manual Therapy 25  OT provide manual lymph drainage (MLD), clear trunk.  OT apply Lymphapress basic pump to R LE at 35mmHg, 20  minutes.    At same time as pump use, OT provide MLD to L LE.   Good tissue texture softening post MLD.    Self Care 30    OT assess skin, take and assess measurements B LEs.    OT applied short stretch bandages to RLE from foot to knee:  -4” stockinette base layer, 1-8cm and 1-10cm short stretch bandage from foot to below knee, heel uninvolved - Comfortable fit achieved.    Home management  Pt continues wrapping with short stretch bandages  Pt elevates B LE daily  Pt continues home exercise program, manual lymph drainage daily.  Pt monitors salt intake.

## 2025-01-07 ENCOUNTER — APPOINTMENT (OUTPATIENT)
Dept: DERMATOLOGY | Facility: CLINIC | Age: 70
End: 2025-01-07
Payer: MEDICARE

## 2025-01-07 VITALS — SYSTOLIC BLOOD PRESSURE: 124 MMHG | DIASTOLIC BLOOD PRESSURE: 78 MMHG | HEART RATE: 68 BPM

## 2025-01-07 DIAGNOSIS — C44.92 SQUAMOUS CELL CARCINOMA OF SKIN: ICD-10-CM

## 2025-01-07 PROCEDURE — 13131 CMPLX RPR F/C/C/M/N/AX/G/H/F: CPT | Performed by: DERMATOLOGY

## 2025-01-07 PROCEDURE — 17311 MOHS 1 STAGE H/N/HF/G: CPT | Performed by: DERMATOLOGY

## 2025-01-07 NOTE — PROGRESS NOTES
Mohs Surgery Operative Note    Date of Surgery:  1/7/2025  Surgeon:  Horace Catherine MD PhD  Office Location: [address] [address]  Dept: [phone]  Dept Fax: [phone]  Referring Provider: Daxa Sosa MD  [address] [place] [address]  [address], [address] [address]      Assessment/Plan   Pre-procedure:   Obtained informed consent: written from patient  The surgical site was identified and confirmed with the patient.     Intra-operative:   Audible time out called at : 11:16 AM 01/07/25  by: Kannan Atkins MA   Verified patient name, birthdate, site, specimen bottle label & requisition.    The planned procedure(s) was again reviewed with the patient. The risks of bleeding, infection, nerve damage and scarring were reviewed. Written authorization was obtained. The patient identity, surgical site, and planned procedure(s) were verified. The provider acted as both surgeon and pathologist.     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    Mohs surgery    Consent obtained: written    Universal Protocol:  Procedure explained and questions answered to patient or proxy's satisfaction: Yes    Test results available and properly labeled: Yes    Pathology report reviewed: Yes    External notes reviewed: Yes    Photo or diagram used for site identification: Yes    Site/side marked: Yes    Slide independently reviewed by Mohs surgeon: Yes    Immediately prior to procedure a time out was called: Yes    Patient identity confirmed: verbally with patient  Preparation: Patient was prepped and draped in usual sterile fashion      Anticoagulation:  Is the patient taking prescription anticoagulant and/or aspirin prescribed/recommended by a physician? No    Was the anticoagulation regimen changed prior to Mohs? No      Anesthesia:  Anesthesia method: local infiltration  Local anesthetic: lidocaine 1% WITH epi    Procedure Details:  Case ID Number:  [id number]  Biopsy accession number: [id number]  Date of biopsy: 10/23/2024  Frozen section biopsy performed: No    Specimen debulked: Yes (Scar)    Pre-Op diagnosis: squamous cell carcinoma  Surgical site (from skin exam): Left Buccal Cheek  Pre-operative length (cm): 0.2  Pre-operative width (cm): 0.3  Indications for Mohs surgery: anatomic location where tissue conservation is critical  Previously treated? Yes    Previous treatment type: cryotherapy    Micrographic Surgery Details:  Post-operative length (cm): 0.6  Post-operative width (cm): 0.6  Number of Mohs stages: 1    Stage 1     Comments: The patient was brought into the operating room and placed in the procedure chair in the appropriate position.  The area positive by previous biopsy was identified and confirmed with the patient. The area of clinically obvious tumor was debulked using a curette and/or scalpel as needed. An incision was made following the Mohs approach through the skin. The specimen was taken to the lab, divided into 2 piece(s) and appropriately chromacoded and processed.                 Tumor features identified on Mohs section: no tumor identified    Depth of defect: subcutaneous fat    Patient tolerance of procedure: tolerated well, no immediate complications    Reconstruction:  Was the defect reconstructed? Yes    Was reconstruction performed by the same Mohs surgeon? Yes    Setting of reconstruction: outpatient office  When was reconstruction performed? same day  Type of reconstruction: linear  Linear reconstruction: complex  Length of linear repair (cm): 2  Subcutaneous Layers (Deep Stitches)   Suture size:  5-0  Suture type:  Vicryl  Stitches:  Buried horizontal mattress  Fine/surface layer approximation (top stitches)   Epidermal/Superficial suture size:  5-0  Epidermal/Superficial suture type:  Fast-absorbing gut  Stitches: simple running    Hemostasis achieved with: electrodesiccation  Outcome: patient tolerated procedure well with no  complications    Post-procedure details: sterile dressing applied    Dressing type: pressure dressing          Complex Linear Repair - Wide Undermining:  Given the location and size of the defect, it was determined that a complex layered linear closure was required to restore normal anatomy and function. The repair was considered complex because extensive undermining was required and performed. The amount of undermining performed was greater than the maximum width of the defect as measured perpendicular to the closure line along at least one entire edge of the defect. Standing cutaneous cones were removed using Burow's triangles. The wound edges were brought into close approximation with buried vertical mattress sutures. The remainder of the wound was then closed with epidermal top sutures.        The final repair measured 2.0 cm    Wound care was discussed, and the patient was given written post-operative wound care instructions.      The patient will follow up with Horace Catherine MD PhD as needed for any post operative problems or concerns, and will follow up with their primary dermatologist as scheduled.

## 2025-01-15 ENCOUNTER — APPOINTMENT (OUTPATIENT)
Dept: OPERATING ROOM | Facility: CLINIC | Age: 70
End: 2025-01-15
Payer: MEDICARE

## 2025-01-16 ENCOUNTER — TREATMENT (OUTPATIENT)
Dept: OCCUPATIONAL THERAPY | Facility: CLINIC | Age: 70
End: 2025-01-16
Payer: MEDICARE

## 2025-01-16 DIAGNOSIS — I89.0 LYMPHEDEMA OF RIGHT LOWER EXTREMITY: ICD-10-CM

## 2025-01-16 DIAGNOSIS — M25.671 ANKLE STIFFNESS, RIGHT: Primary | ICD-10-CM

## 2025-01-16 DIAGNOSIS — M62.81 MUSCLE WEAKNESS (GENERALIZED): ICD-10-CM

## 2025-01-16 DIAGNOSIS — M79.604 LOWER EXTREMITY PAIN, RIGHT: ICD-10-CM

## 2025-01-16 PROCEDURE — 97140 MANUAL THERAPY 1/> REGIONS: CPT | Mod: GO

## 2025-01-16 PROCEDURE — 97535 SELF CARE MNGMENT TRAINING: CPT | Mod: GO

## 2025-01-16 ASSESSMENT — ACTIVITIES OF DAILY LIVING (ADL): HOME_MANAGEMENT_TIME_ENTRY: 25

## 2025-01-16 NOTE — PROGRESS NOTES
Occupational Therapy    Occupational Therapy Treatment    Name: Cady Neal  MRN: [id number]  : 1955  Date: 25    Time Entry:  Time Calculation  Start Time: 906  Stop Time: 1000  Time Calculation (min): 54 min        OT Therapeutic Procedures Time Entry  Manual Therapy Time Entry: 29  Self Care/Home Management (ADLs) Time Entry: 25                Visit #5  1. Ankle stiffness, right        2. Lower extremity pain, right        3. Lymphedema of right lower extremity        4. Muscle weakness (generalized)                   Assessment:  OT provide manual lymph drainage (MLD), trunk, RLE.  MLD techniques refined with pt, good teach back.  R leg tissue texture softening post tx.  Will transition pt to compression stocking once at maximal decongestion.  Pt would benefit from pump for home management, improve mobility and ease of functional mobility, ADLS, IADLS.    Pt showing swelling despite compression, MLD, elevation.    Plan:   Continue OT  for trunk, RLE/BLE lymphedema CDT (complete decongestive therapy), decrease swelling, improve tissue texture, improve mobility and ease of functional mobility, ADLS, IADLS.  Instruct home managent.    Subjective   General:  Pt has been in contact with [facility] for home pump, they will schedule home trial with pt.  Pt wrapping daily.  Pt now able to get into shoes, though wraps bunch up.  Pt report upper left foot arthritis pain.  Pt voiced she wants to transition to compression stockings.    Precautions:  STEADI Fall Risk Score (The score of 4 or more indicates an increased risk of falling): 5  Pt has h/o kidney transplant.    Pain Assessment:   Pt reports right lower leg pain improved.   Pt notes right foot ortho pain.  Pt reports constant low/mid back pain.    Objective    ROM:   R ankles tight, stiff.   Strength:   Decreased standing, walking tolerance.   Sensation:   Intact RLE, pt reports numbness.  Lower Extremity (Skin Appearance/Condition and Girth):    Dryness    Fibrotic edema right lower leg, ankle, foot   Hyperkeratosis ankle, top of toes, mild   Hyperpigmentation pink right lower leg, ankles, foot   + Stemmer Sign right 2nd toe (left negative)   Additional Information:   Firm, full tissue texture lower legs, fullness thigh.  R>L    Lower Extremity Girth Measurements:      RLE cm  Superior border of patella (SBP) 45.0  10 cm above SBP 55.7  15 cm above SBP 59.9  10cm below SBP 37.3   20cm below SBP 41.3  30cm below SBP 34.3  35cm below SBP 28.2  Ankle lobule 28.4  Ankle 25.3  Forefoot 20.7  Increases thigh, decreases distally.    Treatment:   54 minutes    Manual Therapy 29  OT provide manual lymph drainage (MLD), clear trunk, LLE.  OT refine pt technique.  Good tissue texture softening post MLD.    Self Care 25  OT assess skin, take and assess measurements L LEs.  OT refine pt MLD techniques, emphasize extra focus with thigh clearing, instruct technique.  Pt instructed importance to clear trunk and thigh with pump use, pt pursuing pump for home.  OT recommends pump for pt home management.  After tx, OT applied short stretch bandages to RLE from foot to knee:  -4” stockinette base layer, 1-8cm and 1-10cm short stretch bandage from foot to below knee, heel uninvolved - OT modify foot technique, use stirrup method, assess if wraps roll less on foot, for shoe.  Comfortable fit achieved.    Home management  Pt continues wrapping with short stretch bandages  Pt elevates B LE daily  Pt continues home exercise program, manual lymph drainage daily.  Pt monitors salt intake.

## 2025-01-17 NOTE — PROGRESS NOTES
Subjective     Cady Neal is a 69 y.o. female who presents for the following: Skin Check (6 Month FBSE. Hx of MM, SCC, SCCIs, and AK. Kidney Transplant Pt. Area of concern to left bridge of nose (abrasion to nose that has not healed - crusty)).     Skin Cancer Screening  She has a history of heavy sun exposure. She is in the sun daily. She uses sunscreen occasionally. She reports no skin symptoms. Her moles are  not healing .    Spots that concern her: bridge of nose -left side     Hx of MM - 10/14/20, SCC - 10/23/24, SCCIs - 10/12/17, and AK.   Kidney Transplant Pt.     Review of Systems:  No other skin or systemic complaints other than what is documented elsewhere in the note.    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    Past Surgical History:  Cady Neal  has a past surgical history that includes Kidney surgery (2013);  section, classic (2014); Foot surgery (2014); and MR angio head wo IV contrast (2012).    Family History:  Patient family history includes Skin cancer in her brother.       Objective   Well appearing patient in no apparent distress; mood and affect are within normal limits.    A full examination was performed including  scalp, head, eyes, ears, nose, lips, neck, chest, axillae, abdomen, back, buttocks, bilateral upper extremities, bilateral lower extremities, hands, feet, fingers, toes, fingernails, and toenails. All findings within normal limits unless otherwise noted below.    Assessment/Plan   1. Scar conditions and fibrosis of skin  Scar is well-healed without evidence of recurrence    2. Seborrheic keratosis  Stuck on verrucous, tan-brown papules and plaques.      - Discussed benign nature and that no treatment is necessary unless it becomes painful or increases in size. Patient opts for clinical monitoring at this time.     3. Lentigo  Scattered tan macules in sun-exposed areas.    - Discussed benign nature and that no treatment is necessary unless it becomes painful or increases in size. Patient opts for clinical monitoring at this time.     4. Hemangioma of skin  Scattered cherry-red papule(s).    - Discussed benign nature and that no treatment is necessary unless it becomes painful or increases in size. Patient opts for clinical monitoring at this time.     5. Multiple benign nevi  Scattered, uniform and benign-appearing, regular brown melanocytic papules and macules.    - Discussed benign nature and that no treatment is necessary unless it becomes painful or increases in size. Patient opts for clinical monitoring at this time.     6. Neoplasm of uncertain behavior of skin  Left Nasal Sidewall  Scaly erythematous plaque          Lesion biopsy  Type of biopsy: tangential    Informed consent: discussed and consent obtained    Timeout: patient name, date of birth, surgical site, and procedure verified    Procedure prep:  Patient was prepped and draped  Anesthesia: the lesion was anesthetized in a standard fashion    Anesthetic:  1% lidocaine w/ epinephrine 1-100,000 local infiltration  Instrument used: DermaBlade    Hemostasis achieved with: aluminum chloride    Outcome: patient tolerated procedure well    Post-procedure  details: sterile dressing applied and wound care instructions given    Dressing type: petrolatum and bandage      Staff Communication: Dermatology Local Anesthesia: 1 % Lidocaine / Epinephrine - Amount: 1 mL    Specimen 1 - Dermatopathology- DERM LAB  Differential Diagnosis: r/o HAK vs SCC  Check Margins Yes/No?:    Comments:    Dermpath Lab: Routine Histopathology (formalin-fixed tissue)    7. Actinic keratosis (2)  Head - Anterior (Face) (2)  Destruction of Actinic Keratosis Procedure Note  Diagnosis: AK  Location: see physical exam  Informed consent: Discussed risks (permanent scarring, light or dark discoloration, infection, pain, bleeding, bruising, redness, blister formation, and recurrence of the lesion) and benefits of the procedure, as well as the alternatives.  Informed consent was obtained.  Anesthesia: not required  Procedure Details:  The lesion was destroyed with liquid nitrogen. The patient tolerated procedure well.  Total number of lesions treated:  2  Plan:  The patient was instructed on post-op care. Recommend OTC analgesia as needed for pain.      If lesions don't resolve, pt to treat with Efudex BID x 2 weeks then stop. Risks, benefits, side effects, alternatives and options were discussed with patient and the patient voiced understanding. Pt agrees with plan as above.       Destr of lesion - Head - Anterior (Face) (2)  Complexity: simple    Destruction method: cryotherapy    Informed consent: discussed and consent obtained    Lesion destroyed using liquid nitrogen: Yes    Region frozen until ice ball extended beyond lesion: Yes    Cryotherapy cycles:  1  Outcome: patient tolerated procedure well with no complications    Post-procedure details: wound care instructions given      Related Medications  fluorouracil (Efudex) 5 % cream cream  Apply topically 2 times a day. Apply to the affected sharon of the forehead twice daily for a total of 2 weeks. Wash hands thoroughly after each application.    8.  Personal history of malignant melanoma of skin       Follow up 6 months or sooner as needed.

## 2025-01-20 ENCOUNTER — APPOINTMENT (OUTPATIENT)
Dept: DERMATOLOGY | Facility: CLINIC | Age: 70
End: 2025-01-20
Payer: MEDICARE

## 2025-01-20 ENCOUNTER — TREATMENT (OUTPATIENT)
Dept: OCCUPATIONAL THERAPY | Facility: CLINIC | Age: 70
End: 2025-01-20
Payer: MEDICARE

## 2025-01-20 ENCOUNTER — APPOINTMENT (OUTPATIENT)
Dept: OCCUPATIONAL THERAPY | Facility: CLINIC | Age: 70
End: 2025-01-20
Payer: MEDICARE

## 2025-01-20 DIAGNOSIS — D22.9 MULTIPLE BENIGN NEVI: ICD-10-CM

## 2025-01-20 DIAGNOSIS — M62.81 MUSCLE WEAKNESS (GENERALIZED): ICD-10-CM

## 2025-01-20 DIAGNOSIS — D48.5 NEOPLASM OF UNCERTAIN BEHAVIOR OF SKIN: ICD-10-CM

## 2025-01-20 DIAGNOSIS — M25.671 ANKLE STIFFNESS, RIGHT: ICD-10-CM

## 2025-01-20 DIAGNOSIS — L82.1 SEBORRHEIC KERATOSIS: ICD-10-CM

## 2025-01-20 DIAGNOSIS — D18.01 HEMANGIOMA OF SKIN: ICD-10-CM

## 2025-01-20 DIAGNOSIS — Z85.820 PERSONAL HISTORY OF MALIGNANT MELANOMA OF SKIN: ICD-10-CM

## 2025-01-20 DIAGNOSIS — L81.4 LENTIGO: ICD-10-CM

## 2025-01-20 DIAGNOSIS — I89.0 LYMPHEDEMA OF RIGHT LOWER EXTREMITY: Primary | ICD-10-CM

## 2025-01-20 DIAGNOSIS — L57.0 ACTINIC KERATOSIS: ICD-10-CM

## 2025-01-20 DIAGNOSIS — L90.5 SCAR CONDITIONS AND FIBROSIS OF SKIN: Primary | ICD-10-CM

## 2025-01-20 DIAGNOSIS — M79.604 LOWER EXTREMITY PAIN, RIGHT: ICD-10-CM

## 2025-01-20 PROCEDURE — 1159F MED LIST DOCD IN RCRD: CPT | Performed by: DERMATOLOGY

## 2025-01-20 PROCEDURE — 97140 MANUAL THERAPY 1/> REGIONS: CPT | Mod: GO

## 2025-01-20 PROCEDURE — 17003 DESTRUCT PREMALG LES 2-14: CPT | Performed by: DERMATOLOGY

## 2025-01-20 PROCEDURE — 97535 SELF CARE MNGMENT TRAINING: CPT | Mod: GO

## 2025-01-20 PROCEDURE — 11102 TANGNTL BX SKIN SINGLE LES: CPT | Performed by: DERMATOLOGY

## 2025-01-20 PROCEDURE — 99214 OFFICE O/P EST MOD 30 MIN: CPT | Performed by: DERMATOLOGY

## 2025-01-20 PROCEDURE — 17000 DESTRUCT PREMALG LESION: CPT | Performed by: DERMATOLOGY

## 2025-01-20 RX ORDER — FLUOROURACIL 50 MG/G
CREAM TOPICAL 2 TIMES DAILY
Qty: 40 G | Refills: 0 | Status: SHIPPED | OUTPATIENT
Start: 2025-01-20 | End: 2025-04-14

## 2025-01-20 ASSESSMENT — ACTIVITIES OF DAILY LIVING (ADL): HOME_MANAGEMENT_TIME_ENTRY: 38

## 2025-01-20 NOTE — PROGRESS NOTES
Occupational Therapy    Occupational Therapy Treatment    Name: Cady Neal  MRN: [id number]  : 1955  Date: 25    Time in 0737  Time out 0830  Total time 53 minutes     Visit #7  1. Lymphedema of right lower extremity        2. Muscle weakness (generalized)        3. Ankle stiffness, right        4. Lower extremity pain, right                   Assessment:  OT provide manual lymph drainage (MLD),  pump use.  BLE tissue texture softening post tx.  Will transition pt to compression stocking once at maximal decongestion.  Pt would benefit from pump for home management, improve mobility and ease of functional mobility, ADLS, IADLS.    Pt showing swelling increases today.  Leg swelling continues despite compression, MLD, exercise and elevation.    Plan:   Continue OT  for trunk, RLE/BLE lymphedema CDT (complete decongestive therapy), decrease swelling, improve tissue texture, improve mobility and ease of functional mobility, ADLS, IADLS.  Instruct home managent.    Subjective   General:  Pt has been in contact with [facility] for home pump, they will schedule home trial with pt. Pt received call/message, will call them back.  Pt wrapping daily.  Pt does her MLD and exercises daily as well as elevates her leg.  Pt report upper left foot arthritis pain.  Pt states she has been on her feet a lot, notes swelling today.    Precautions:  STEADI Fall Risk Score (The score of 4 or more indicates an increased risk of falling): 5  Pt has h/o kidney transplant.    Pain Assessment:   Pt reports right lower leg pain improved.   Pt reports constant low/mid back pain.    Objective    ROM:   R ankles tight, stiff.   Mild stiffness left ankle.  Strength:   Decreased standing, walking tolerance.   Sensation:   Intact RLE, pt reports numbness.  Lower Extremity (Skin Appearance/Condition and Girth):   Dryness    Fibrotic edema right lower leg, ankle, foot   Hyperkeratosis ankle, top of toes, mild   Hyperpigmentation pink  right lower leg, ankles, foot   + Stemmer Sign right 2nd toe (left negative)   Additional Information:   Firm, full tissue texture lower legs, fullness thigh.  R>L    Lower Extremity Girth Measurements:      RLE cm  Superior border of patella (SBP) 45.5    ---after pump 45.1  10 cm above SBP -  15 cm above SBP -  10cm below SBP 38.3     ----38.2  20cm below SBP 41.7     ----41.6  30cm below SBP 33.3     -----32.4  35cm below SBP 28.9     ----28.5  Ankle lobule 28.7           ----28.2  Ankle 26.2               -----25.8  Forefoot 21.4       -----21.2  Increases noted today.    LLE cm  Superior border of patella (SBP) 40.3  10 cm above SBP -  15 cm above SBP -  10cm below SBP 33.2  20cm below SBP 37.8  30cm below SBP 31.8  35cm below SBP 27.4  Ankle lobule 25.2  Ankle 23.3  Forefoot 20.3  Left leg fluctuations since initial eval 12/12/24    Treatment:   53 minutes    Manual Therapy 15  OT provide manual lymph drainage (MLD), clear trunk.  OT apply [facility] Optimal to RLE, 20 minutes @35 mmHg, wave mode.  OT provide MLD LLE while pump on right leg.  Good tissue texture softening post MLD.    Self Care 38  OT assess skin, take and assess measurements BLEs.  OT refine pt MLD techniques, last session. Pt reports improved self technique with her HEP.  Pt instructed in importance to clear trunk and thigh with pump use, pt pursuing pump for home.  OT recommends pump for pt home management.  After tx, OT applied short stretch bandages to RLE from foot to knee:  -4” stockinette base layer, 1-8cm and 1-10cm short stretch bandage from foot to below knee, heel uninvolved - Stirrup style for foot wrapping with increased comfort with shoe wear, walking.    Home management  Pt continues wrapping with short stretch bandages  Pt elevates B LE daily  Pt continues home exercise program, manual lymph drainage daily.  Pt monitors salt intake.

## 2025-01-24 LAB
LAB AP ASR DISCLAIMER: NORMAL
LABORATORY COMMENT REPORT: NORMAL
PATH REPORT.FINAL DX SPEC: NORMAL
PATH REPORT.GROSS SPEC: NORMAL
PATH REPORT.MICROSCOPIC SPEC OTHER STN: NORMAL
PATH REPORT.RELEVANT HX SPEC: NORMAL
PATH REPORT.TOTAL CANCER: NORMAL

## 2025-01-27 ENCOUNTER — TREATMENT (OUTPATIENT)
Dept: OCCUPATIONAL THERAPY | Facility: CLINIC | Age: 70
End: 2025-01-27
Payer: MEDICARE

## 2025-01-27 DIAGNOSIS — I89.0 LYMPHEDEMA OF RIGHT LOWER EXTREMITY: ICD-10-CM

## 2025-01-27 DIAGNOSIS — M79.604 LOWER EXTREMITY PAIN, RIGHT: ICD-10-CM

## 2025-01-27 DIAGNOSIS — M25.671 ANKLE STIFFNESS, RIGHT: Primary | ICD-10-CM

## 2025-01-27 DIAGNOSIS — C44.321 SQUAMOUS CELL CARCINOMA OF SKIN OF NOSE: Primary | ICD-10-CM

## 2025-01-27 DIAGNOSIS — M62.81 MUSCLE WEAKNESS (GENERALIZED): ICD-10-CM

## 2025-01-27 PROCEDURE — 97535 SELF CARE MNGMENT TRAINING: CPT | Mod: GO

## 2025-01-27 PROCEDURE — 97140 MANUAL THERAPY 1/> REGIONS: CPT | Mod: GO

## 2025-01-27 ASSESSMENT — ACTIVITIES OF DAILY LIVING (ADL): HOME_MANAGEMENT_TIME_ENTRY: 15

## 2025-01-27 NOTE — PROGRESS NOTES
Occupational Therapy    Occupational Therapy Treatment    Name: Cady Neal  MRN: [id number]  : 1955  Date: 25    Time Entry:  Time Calculation  Start Time: 904  Stop Time: 959  Time Calculation (min): 55 min        OT Therapeutic Procedures Time Entry  Manual Therapy Time Entry: 40  Self Care/Home Management (ADLs) Time Entry: 15                 Visit #8  1. Ankle stiffness, right        2. Lower extremity pain, right        3. Lymphedema of right lower extremity        4. Muscle weakness (generalized)                   Assessment:  OT provide manual lymph drainage (MLD), clear trunk, focus on right leg.  RLE tissue texture softening post tx.  Will transition pt to compression stocking once at maximal decongestion.  Thigh high prescription attained.  Pt would benefit from pump for home management, improve mobility and ease of functional mobility, ADLS, IADLS.  Referral in process.  Leg swelling continues despite compression, MLD, exercise and elevation.    Plan:   Continue OT  for trunk, RLE/BLE lymphedema CDT (complete decongestive therapy), decrease swelling, improve tissue texture, improve mobility and ease of functional mobility, ADLS, IADLS.  Instruct home managent.    Subjective   General:  Pt had fitting for home pump from [employer], they came to her house last week.  Pt wrapping daily.  Pt does her MLD and exercises daily as well as elevates her leg.  Swelling continues despite following lymphedema programs.    Precautions:  STEADI Fall Risk Score (The score of 4 or more indicates an increased risk of falling): 5  Pt has h/o kidney transplant.    Pain Assessment:   Pt denies pain right leg.   Pt reports constant low/mid back pain.    Objective    ROM:   R ankles tight, stiff.   Mild stiffness left ankle.  Strength:   Decreased standing, walking tolerance.   Sensation:   Intact RLE, pt reports numbness.  Lower Extremity (Skin Appearance/Condition and Girth):   Dryness    Fibrotic edema  right lower leg, ankle, foot   Hyperkeratosis ankle, top of toes, mild   Hyperpigmentation pink right lower leg, ankles, foot   + Stemmer Sign right 2nd toe (left negative)   Additional Information:   Firm, full tissue texture lower legs, fullness thigh.  R>L    Lower Extremity Girth Measurements:   RLE cm  Superior border of patella (SBP) 42.8  10 cm above SBP -  15 cm above SBP -  10cm below SBP 38.8  20cm below SBP 42.0  30cm below SBP 34.2  35cm below SBP 29.4  Ankle lobule 28.2  Ankle 25.5  Forefoot 21.1  Increases noted.    Treatment:   55 minutes    Manual Therapy 40  OT provide manual lymph drainage (MLD), clear trunk, focus on right leg.  Gentle soft tissue techniques with MLD distal leg, ankle.  RLE tissue texture softening post tx.    Self Care 15  OT assess skin, tissue texture  Pt reports legs feel swollen (R>L), reports she was on her feet a lot over weekend.  OT review pt MLD home program.  Pt reports good follow through.  Pt reports she was measured for [facility] pump. OT educate on pump use strategies for home.  OT discuss compression, attained right thigh high compression stocking prescription.  Will have pt pursue once right leg stabilizes, once pt is stable with home pump use.  Pt to be fit at [facility] once stable.    After tx, OT applied short stretch bandages to RLE from foot to knee:  -4” stockinette base layer, 1-8cm and 1-10cm short stretch bandage from foot to below knee, heel uninvolved - Stirrup style for foot wrapping with increased comfort with shoe wear, walking.    Home management  Pt continues wrapping with short stretch bandages  Pt elevates B LE daily  Pt continues home exercise program, manual lymph drainage daily.  Pt monitors salt intake.

## 2025-01-28 ENCOUNTER — SPECIALTY PHARMACY (OUTPATIENT)
Dept: PHARMACY | Facility: CLINIC | Age: 70
End: 2025-01-28

## 2025-01-28 ENCOUNTER — APPOINTMENT (OUTPATIENT)
Dept: INFECTIOUS DISEASES | Facility: CLINIC | Age: 70
End: 2025-01-28
Payer: MEDICARE

## 2025-01-28 VITALS
HEIGHT: 63 IN | HEART RATE: 73 BPM | BODY MASS INDEX: 30.12 KG/M2 | SYSTOLIC BLOOD PRESSURE: 127 MMHG | WEIGHT: 170 LBS | DIASTOLIC BLOOD PRESSURE: 84 MMHG | TEMPERATURE: 95.9 F

## 2025-01-28 DIAGNOSIS — N39.0 RECURRENT UTI: Primary | ICD-10-CM

## 2025-01-28 PROCEDURE — 1036F TOBACCO NON-USER: CPT | Performed by: INTERNAL MEDICINE

## 2025-01-28 PROCEDURE — 3008F BODY MASS INDEX DOCD: CPT | Performed by: INTERNAL MEDICINE

## 2025-01-28 PROCEDURE — 1126F AMNT PAIN NOTED NONE PRSNT: CPT | Performed by: INTERNAL MEDICINE

## 2025-01-28 PROCEDURE — 1159F MED LIST DOCD IN RCRD: CPT | Performed by: INTERNAL MEDICINE

## 2025-01-28 PROCEDURE — RXMED WILLOW AMBULATORY MEDICATION CHARGE

## 2025-01-28 PROCEDURE — 99214 OFFICE O/P EST MOD 30 MIN: CPT | Performed by: INTERNAL MEDICINE

## 2025-01-28 ASSESSMENT — PAIN SCALES - GENERAL: PAINLEVEL_OUTOF10: 0-NO PAIN

## 2025-01-28 NOTE — PROGRESS NOTES
Update January 28, 2025  Patient reports doing well over the last year.  There are some issues with her prescriptions when she changed insurance and in this context she decided to stop her chronic oral suppressive cefadroxil in July.  She has done well that time with no symptomatic UTIs.  No new medical issues  No other UTI prevention strategies such as topical estrogen     Patient looked healthy!     Assessment-doing well now more than 6 months off of chronic suppressive therapy.  We discussed the rationale for this in the past but she is doing well off it.  Will continue prospective management.  There is a standing order for urine culture should she develop symptoms.  Will see her back in a year    From our in January 2024  -  Prior to seeing the patient we reviewed all the information in the  system from last year     Annual follow-up for recurrent UTIs in the setting of kidney transplant.  We seen her first year ago.  She had been followed for number years by a colleague who left the institution.  She had her on oral cephalosporin chronic suppression which we continue last year because to see me working.  In the past she had been on estrogen topical but not taking this now.  When I saw her today she said she has not had a UTI in the last year.  Continues on cefadroxil without difficulty.    In person visit and patient looks well normal mood and affect normal mentation    Assessment/plan- patient appears to doing very well on chronic suppressive therapy with cefadroxil.  As I told her the first only letter this strategy often does not work in postmenopausal women tend to get frequently colonized to do bacteria but it does appear to work in her.  Her prior urine cultures all appear to be the same straight E. 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  Will continue strategy as long as it works.  Would put a standing order for urine culture urinalysis.  We scheduled her back in a  year but we will call her if there is any intercurrent UTIs.  Currently not using topical estrogen.  If she starts having frequent UTIs we would readdress this issue but doing well for now

## 2025-01-28 NOTE — LETTER
01/28/25    Curt Chang MD  [address] [address]  [facility] [address]      Dear Dr. Curt Chang MD,    I am writing to confirm that your patient, Cady Neal, received care in my office on 01/28/25. I have enclosed a summary of the care provided to Cady for your reference.    Please contact me with any questions you may have regarding the visit.    Sincerely,         Mick Malin MD  [address]  [facility] [address]  [facility] [address]    CC: No Recipients

## 2025-01-29 ENCOUNTER — TELEPHONE (OUTPATIENT)
Facility: HOSPITAL | Age: 70
End: 2025-01-29
Payer: MEDICARE

## 2025-01-29 DIAGNOSIS — Z94.0 KIDNEY REPLACED BY TRANSPLANT (HHS-HCC): ICD-10-CM

## 2025-01-29 NOTE — TELEPHONE ENCOUNTER
Lab orders replaced. LVM letting pt know her orders were updated and to call back with any further questions

## 2025-01-29 NOTE — TELEPHONE ENCOUNTER
Patient called requesting to speak with coordinator about  no lab orders placed .  Patient call back number is [phone] .

## 2025-01-29 NOTE — TELEPHONE ENCOUNTER
Patient called requesting to speak with coordinator about  returning a missed call .  Patient call back number is [phone] .

## 2025-01-30 ENCOUNTER — TELEPHONE (OUTPATIENT)
Facility: HOSPITAL | Age: 70
End: 2025-01-30

## 2025-01-30 ENCOUNTER — PHARMACY VISIT (OUTPATIENT)
Dept: PHARMACY | Facility: CLINIC | Age: 70
End: 2025-01-30
Payer: COMMERCIAL

## 2025-01-30 NOTE — TELEPHONE ENCOUNTER
Returned patients call. Let her know labs were updated. Pt requested an appt- call tx to [facility] to schedule appt

## 2025-01-30 NOTE — TELEPHONE ENCOUNTER
Patient scheduled for nephrology as requested by coordinator.   Location, date, and instructions given to patient.   Advised patient that if any changes to appointment are needed, they must call the office so we are aware.     Name: Cady Neal MRN: [id number]  Date: 2/7/2025 Status: [facility]  Time: 11:20 AM    Arrive By:        Length: 20    Visit Type: TXP KIDNEY PHYSICIAN [[id number]] Copay: $0.00  Provider: TXP KIDNEY PHYSICIAN Department: [address]      Department Address: [address] [place] [address] Sharri  [address] [address]    No further action required

## 2025-01-31 DIAGNOSIS — N39.0 RECURRENT UTI: ICD-10-CM

## 2025-02-03 ENCOUNTER — APPOINTMENT (OUTPATIENT)
Dept: OCCUPATIONAL THERAPY | Facility: CLINIC | Age: 70
End: 2025-02-03
Payer: MEDICARE

## 2025-02-03 ENCOUNTER — LAB (OUTPATIENT)
Dept: LAB | Facility: HOSPITAL | Age: 70
End: 2025-02-03
Payer: MEDICARE

## 2025-02-03 ENCOUNTER — APPOINTMENT (OUTPATIENT)
Dept: LAB | Facility: HOSPITAL | Age: 70
End: 2025-02-03
Payer: MEDICARE

## 2025-02-03 DIAGNOSIS — M62.81 MUSCLE WEAKNESS (GENERALIZED): ICD-10-CM

## 2025-02-03 DIAGNOSIS — I89.0 LYMPHEDEMA OF RIGHT LOWER EXTREMITY: Primary | ICD-10-CM

## 2025-02-03 DIAGNOSIS — Z94.0 KIDNEY TRANSPLANT STATUS: Primary | ICD-10-CM

## 2025-02-03 DIAGNOSIS — M25.671 ANKLE STIFFNESS, RIGHT: ICD-10-CM

## 2025-02-03 DIAGNOSIS — M79.604 LOWER EXTREMITY PAIN, RIGHT: ICD-10-CM

## 2025-02-03 LAB
25(OH)D3 SERPL-MCNC: 71 NG/ML (ref 30–100)
ALBUMIN SERPL BCP-MCNC: 3.5 G/DL (ref 3.4–5)
ANION GAP SERPL CALC-SCNC: 10 MMOL/L (ref 10–20)
BUN SERPL-MCNC: 22 MG/DL (ref 6–23)
CALCIUM SERPL-MCNC: 9.3 MG/DL (ref 8.6–10.6)
CHLORIDE SERPL-SCNC: 103 MMOL/L (ref 98–107)
CO2 SERPL-SCNC: 29 MMOL/L (ref 21–32)
CREAT SERPL-MCNC: 0.86 MG/DL (ref 0.5–1.05)
CREAT UR-MCNC: 61.2 MG/DL (ref 20–320)
EGFRCR SERPLBLD CKD-EPI 2021: 74 ML/MIN/1.73M*2
ERYTHROCYTE [DISTWIDTH] IN BLOOD BY AUTOMATED COUNT: 12.8 % (ref 11.5–14.5)
GLUCOSE SERPL-MCNC: 99 MG/DL (ref 74–99)
HCT VFR BLD AUTO: 39.7 % (ref 36–46)
HGB BLD-MCNC: 12.3 G/DL (ref 12–16)
HOLD SPECIMEN: NORMAL
MCH RBC QN AUTO: 28.3 PG (ref 26–34)
MCHC RBC AUTO-ENTMCNC: 31 G/DL (ref 32–36)
MCV RBC AUTO: 92 FL (ref 80–100)
NRBC BLD-RTO: 0 /100 WBCS (ref 0–0)
PHOSPHATE SERPL-MCNC: 2.9 MG/DL (ref 2.5–4.9)
PLATELET # BLD AUTO: 170 X10*3/UL (ref 150–450)
POTASSIUM SERPL-SCNC: 4.2 MMOL/L (ref 3.5–5.3)
PROT UR-ACNC: 8 MG/DL (ref 5–24)
PROT/CREAT UR: 0.13 MG/MG CREAT (ref 0–0.17)
PTH-INTACT SERPL-MCNC: 69.4 PG/ML (ref 18.5–88)
RBC # BLD AUTO: 4.34 X10*6/UL (ref 4–5.2)
SODIUM SERPL-SCNC: 138 MMOL/L (ref 136–145)
TACROLIMUS BLD-MCNC: 4.9 NG/ML
WBC # BLD AUTO: 5.8 X10*3/UL (ref 4.4–11.3)

## 2025-02-03 PROCEDURE — 85027 COMPLETE CBC AUTOMATED: CPT

## 2025-02-03 PROCEDURE — 82306 VITAMIN D 25 HYDROXY: CPT

## 2025-02-03 PROCEDURE — 84156 ASSAY OF PROTEIN URINE: CPT

## 2025-02-03 PROCEDURE — 82570 ASSAY OF URINE CREATININE: CPT

## 2025-02-03 PROCEDURE — 83970 ASSAY OF PARATHORMONE: CPT

## 2025-02-03 PROCEDURE — 80069 RENAL FUNCTION PANEL: CPT

## 2025-02-03 PROCEDURE — 80197 ASSAY OF TACROLIMUS: CPT

## 2025-02-07 ENCOUNTER — OFFICE VISIT (OUTPATIENT)
Facility: HOSPITAL | Age: 70
End: 2025-02-07
Payer: MEDICARE

## 2025-02-07 VITALS
HEART RATE: 96 BPM | DIASTOLIC BLOOD PRESSURE: 92 MMHG | SYSTOLIC BLOOD PRESSURE: 134 MMHG | BODY MASS INDEX: 29.58 KG/M2 | WEIGHT: 167 LBS | OXYGEN SATURATION: 97 % | TEMPERATURE: 98.1 F

## 2025-02-07 DIAGNOSIS — Z92.25 PERSONAL HISTORY OF IMMUNOSUPRESSION THERAPY: ICD-10-CM

## 2025-02-07 DIAGNOSIS — N39.0 RECURRENT UTI: ICD-10-CM

## 2025-02-07 DIAGNOSIS — Z79.899 ENCOUNTER FOR LONG-TERM (CURRENT) USE OF HIGH-RISK MEDICATION: ICD-10-CM

## 2025-02-07 DIAGNOSIS — Z48.298 AFTERCARE FOLLOWING ORGAN TRANSPLANT: Primary | ICD-10-CM

## 2025-02-07 PROCEDURE — 99214 OFFICE O/P EST MOD 30 MIN: CPT | Performed by: FAMILY MEDICINE

## 2025-02-07 ASSESSMENT — PAIN SCALES - GENERAL: PAINLEVEL_OUTOF10: 0-NO PAIN

## 2025-02-07 NOTE — PROGRESS NOTES
TRANSPLANT NEPHROLOGY :   OUTPATIENT CLINIC NOTE      SERVICE DATE : 02/07/2025    REASON FOR VISIT/CHIEF COMPLAINT:  S/P  TRANSPLANT SURGERY  IMMUNOSUPPRESSIVE MEDICATION MANAGEMENT  BLOOD PRESSURE MANAGEMENT    HPI:    Ms. Neal is a 69 y.o. female with past medical history significant for end stage renal disease secondary to Polycystic kidney disease status post living donor kidney transplant on 11/30/07. Donor was her . She underwent bilateral native nephrectomy on 11/18/16. She sees ID, for recurrent UTIs and is on suppressive antibiotic prophylaxis. Last visit with ID was in Jan 2024. Per note, Continue prophylactic cefadroxil 500 mg daily and Premarin cream for UTI prevention and follow up in a year.      Patient is here for follow up s/p kidney transplant.    Today, she has no complaints or issues today. She has been doing well and has not had any issues. No questions. She would like to continue to see transplant as they were such a large part of her life for so long.     Patient is doing well overall. No new complaints. Denied chest pain, SOB, GOETZ, Palpitation. Normal urination and bowel movement. Normal gait and no weakness of arms/legs. No cough, runny nose, sore throat, cold symptoms, or rash. No hearing loss. Normal vision.No problems with his sleep, mood and function. No recent infection, hospitalization, surgery or ER visits.      ROS:  Review of  14 systems was performed system by system. See HPI. Otherwise, the symptoms were negative.    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      History of polycystic kidney disease    Pruritus vulvae 2014    Vulvar itching    Solar lentigo 2024    Squamous cell carcinoma in situ (SCCIS) 10/12/2017        PAST SURGICAL HISTORY:  Past Surgical History:   Procedure Laterality Date     SECTION, CLASSIC  2014     Section    FOOT SURGERY  2014    Foot Surgery    KIDNEY SURGERY  2013    Kidney Surgery    MR HEAD ANGIO WO IV CONTRAST  2012    MR HEAD ANGIO WO IV CONTRAST 2012 [facility]        SOCIAL HISTORY:  Social History     Socioeconomic History    Marital status:      Spouse name: Not on file    Number of children: Not on file    Years of education: Not on file    Highest education level: Not on file   Occupational History    Not on file   Tobacco Use    Smoking status: Never    Smokeless tobacco: Never   Substance and Sexual Activity    Alcohol use: Yes     Comment: soc    Drug use: Never    Sexual activity: Not on file   Other Topics Concern    Not on file   Social History Narrative    Not on file     Social Drivers of Health     Financial Resource Strain: Not on file   Food Insecurity: No Food Insecurity (2024)    Received from [facility]    Hunger Vital Sign     Worried About Running Out of Food in the Last Year: Never true     Ran Out of Food in the Last Year: Never true   Transportation Needs: No Transportation Needs (2024)    Received from [facility]    PRAPARE - Transportation     Lack of Transportation (Medical): No     Lack of Transportation (Non-Medical): No   Physical Activity: Not on file   Stress: Not on file   Social Connections: Not on file   Intimate Partner Violence: Not on file   Housing Stability: Low Risk  (2024)    Received from [facility]    Housing Stability Vital Sign     Unable to Pay for Housing in the Last Year: No     Number of Places Lived in the Last Year: 1      Unstable Housing in the Last Year: No       FAMILY HISTORY:  Family History   Problem Relation Name Age of Onset    Skin cancer Brother         MEDICATION LIST:  Current Outpatient Medications   Medication Instructions    Ca-D3-mag-zinc--colette-boron (Caltrate 600-D Plus Minerals) 600 mg calcium- 800 unit-40 mg tablet,chewable Every 12 hours    dicyclomine (Bentyl) 10 mg capsule Every 12 hours    DULoxetine (Cymbalta) 30 mg DR capsule 1 capsule, Every 24 hours    fluorouracil (Efudex) 5 % cream cream Topical, 2 times daily, Apply to the affected sharon of the forehead twice daily for a total of 2 weeks. Wash hands thoroughly after each application.    mycophenolate (Cellcept) 250 mg capsule TAKE TWO (2) CAPSULES BY MOUTH TWICE DAILY.    omeprazole (PRILOSEC) 20 mg, 2 times daily    Sutab 1.479-0.188- 0.225 gram tablet Take by mouth. Use as directed    tacrolimus (Prograf) 1 mg capsule TAKE ONE (1) CAPSULE BY MOUTH TWICE DAILY.       ALLERGY  No Known Allergies    PHYSICAL EXAM:    Visit Vitals  BP (!) 134/92   Pulse 96   Temp 36.7 °C (98.1 °F) (Temporal)   Wt 75.8 kg (167 lb)   SpO2 97%   BMI 29.58 kg/m²   Smoking Status Never   BSA 1.84 m²          Vital signs - reviewed. Acceptable BP at this office visit.   General Appearance - NAD, Good speech, oriented and alert  HEENT - Supple. Not pale. No jaundice.    CVS - RRR.    Lungs- clear to auscultation bilaterally  Abdomen - soft , not tender, no guarding,  S/P Kidney transplant .  Transplanted kidney is not tender.   Musculoskeletal /Extremities - no edema. Lymphedema in Right lower ext  Neuro/Psych - appropriate mood and affect.    Skin - No visible rash      LABS:    Lab Results   Component Value Date    WBC 5.8 02/03/2025    HGB 12.3 02/03/2025    HCT 39.7 02/03/2025     02/03/2025    CHOL 182 12/08/2020    TRIG 102 12/08/2020    HDL 60.0 12/08/2020     02/03/2025    K 4.2 02/03/2025     02/03/2025    CREATININE 0.86 02/03/2025    BUN 22  02/03/2025    CO2 29 02/03/2025    TSH 1.40 05/07/2024    HGBA1C 4.7 12/08/2020          ASSESSMENT AND PLAN:    Ms. Neal is a 69 y.o. female  who is here for follow up s/p kidney transplant.    TRANSPLANT DATE: 11/30/2007 (Kidney)      1. ESRD S/P kidney transplant   - Creatinine last check was :  Lab Results   Component Value Date    CREATININE 0.86 02/03/2025       - Renal allograft function is excellent.    -Ensure adequate hydration  - Avoid nephrotoxic medications, NSAIDs, and IV contrast.    2. Immunosuppression  -Tacrolimus level last check was 4.9, goal 4-6  -Continue current immunosuppression regimen.    3. Electrolytes  Lab Results   Component Value Date    GLUCOSE 99 02/03/2025    CALCIUM 9.3 02/03/2025     02/03/2025    K 4.2 02/03/2025    CO2 29 02/03/2025     02/03/2025    BUN 22 02/03/2025    CREATININE 0.86 02/03/2025     -Acceptable from last lab drawn    4. Hypertension  Blood Pressures         2/7/2025  1132             BP: 134/92          -Home  BP had been acceptable - they are always in 110-120's at home. Cont to mon  -Encourage to monitor home BP  -Continue current anti hypertensive medication    5. Bone Mineral Disease/Osteoporosis  Lab Results   Component Value Date    PTH 69.4 02/03/2025    CALCIUM 9.3 02/03/2025    PHOS 2.9 02/03/2025    VITD25 71 02/03/2025     -check VIT D, PTH with next lab  - Consider DEXA every 2-3 years , defer to PCP  - May consider initiation of Sensipar when PTH >300 AND Ca >8.4    6.Anemia  Lab Results   Component Value Date    WBC 5.8 02/03/2025    HGB 12.3 02/03/2025    HCT 39.7 02/03/2025    MCV 92 02/03/2025     02/03/2025        -asymptomatic  - Continue to monitor  -check iron studies and ferritin. Will consider MOISES as needed.  - No indications for blood transfusion     7.Health maintenance and vaccination  - Flu shot during flu season annually  - Cancer screening is up to date per the patient    Lab : Routine transplant lab ( CBC,  RFP, and anti-rejection trough level ) every 3 months  Additional labs:  VIT D, PTH with next lab  Viral screening PCR, Allosure and UPC per protocol.    Additional Plan :  Cont current meds and return schedule. Labs every 3 months.  Follow with PCP for age appropriate cancer screenings and high risk screenings.    RTC 12 month(s)    Sharon Headley, APRN-CNP    Transplant Nephrology

## 2025-02-12 ENCOUNTER — TREATMENT (OUTPATIENT)
Dept: OCCUPATIONAL THERAPY | Facility: CLINIC | Age: 70
End: 2025-02-12
Payer: MEDICARE

## 2025-02-12 DIAGNOSIS — I89.0 LYMPHEDEMA OF RIGHT LOWER EXTREMITY: Primary | ICD-10-CM

## 2025-02-12 DIAGNOSIS — M25.671 ANKLE STIFFNESS, RIGHT: ICD-10-CM

## 2025-02-12 DIAGNOSIS — M79.604 LOWER EXTREMITY PAIN, RIGHT: ICD-10-CM

## 2025-02-12 DIAGNOSIS — M62.81 MUSCLE WEAKNESS (GENERALIZED): ICD-10-CM

## 2025-02-12 PROCEDURE — 97140 MANUAL THERAPY 1/> REGIONS: CPT | Mod: GO

## 2025-02-12 PROCEDURE — 97535 SELF CARE MNGMENT TRAINING: CPT | Mod: GO

## 2025-02-12 ASSESSMENT — ACTIVITIES OF DAILY LIVING (ADL): HOME_MANAGEMENT_TIME_ENTRY: 15

## 2025-02-12 NOTE — PROGRESS NOTES
Occupational Therapy    Occupational Therapy Treatment    Name: Cady Neal  MRN: [id number]  : 1955  Date: 2025    Time Entry:  Time Calculation  Start Time: 805  Stop Time: 845  Time Calculation (min): 40 min        OT Therapeutic Procedures Time Entry  Manual Therapy Time Entry: 25  Self Care/Home Management (ADLs) Time Entry: 15                Visit #9  1. Lymphedema of right lower extremity        2. Lower extremity pain, right        3. Ankle stiffness, right        4. Muscle weakness (generalized)                 Assessment:  RLE tissue texture softening post manual lymph drainage (MLD) tx.  Pt received her pump and is using it at home.  Transition pt to compression stocking: right thigh high 20-30 mmHg.  Consider left leg compression due to mild lymphedema.     Plan:   Continue OT  for trunk, RLE/BLE lymphedema CDT (complete decongestive therapy), decrease swelling, improve tissue texture, improve mobility and ease of functional mobility, ADLS, IADLS.  Instruct home managent.    Subjective   General:  Pt received her lymphedema pump last week, reports it is going well.    Precautions:  STEADI Fall Risk Score (The score of 4 or more indicates an increased risk of falling): 5  Pt has h/o kidney transplant.    Pain Assessment:   Pt denies pain right leg.   Pt reports constant low/mid back pain.    Objective    ROM:   R ankles tight, stiff.   Mild stiffness left ankle.  Strength:   Decreased standing, walking tolerance.   Sensation:   Intact RLE, pt reports numbness.  Lower Extremity (Skin Appearance/Condition and Girth):   Dryness    Fibrotic edema right lower leg, ankle, foot   Hyperkeratosis ankle, top of toes, mild   Hyperpigmentation pink right lower leg, ankles, foot   + Stemmer Sign right 2nd toe (left negative)   Additional Information:   Firm, full tissue texture lower legs, fullness thigh.  R>L    Lower Extremity Girth Measurements:   RLE cm  Superior border of patella (SBP) 43.8  10  cm above SBP -  15 cm above SBP -  10cm below SBP 36.8  20cm below SBP 40.8  30cm below SBP 32.0  35cm below SBP 27.2  Ankle lobule 27.0  Ankle 24.8  Forefoot 20.7  Decreases noted.    LLE cm  Superior border of patella (SBP) 38.8  10 cm above SBP -  15 cm above SBP -  10cm below SBP 33.4  20cm below SBP 37.1  30cm below SBP 29.7  35cm below SBP 25.9  Ankle lobule 23.4  Ankle 22.2  Forefoot 19.7  Decreases since initial eval 12/12/24    Treatment:   40 minutes    Manual Therapy 25  OT provide manual lymph drainage (MLD), clear trunk, RLE.  Gentle soft tissue techniques with MLD distal leg, ankle.  RLE tissue texture softening post tx.    Self Care 15  OT assess skin, tissue texture, take and assess measurements.  Decreases noted.  OT gave pt a copy of compression stocking referral, pt to call Aziza at [facility] for fitting. Pt voiced she likes open toe option.  OT discussed mild compression stocking for left leg for lymphatic support. Pt agreed.  OT to obtain prescription for left leg.  After tx, OT applied short stretch bandages to RLE from foot to knee:  -4” stockinette base layer, 1-8cm and 1-10cm short stretch bandage from foot to below knee, heel uninvolved - Stirrup style for foot wrapping with increased comfort with shoe wear, walking.    Home management  Pt continues wrapping with short stretch bandages  Pt elevates B LE daily  Pt continues home exercise program, manual lymph drainage daily.  Pt clears trunk with pump use.  Pt monitors salt intake.

## 2025-02-14 DIAGNOSIS — N39.0 RECURRENT UTI: ICD-10-CM

## 2025-02-18 ENCOUNTER — TREATMENT (OUTPATIENT)
Dept: OCCUPATIONAL THERAPY | Facility: CLINIC | Age: 70
End: 2025-02-18
Payer: MEDICARE

## 2025-02-18 DIAGNOSIS — M79.604 LOWER EXTREMITY PAIN, RIGHT: ICD-10-CM

## 2025-02-18 DIAGNOSIS — M25.671 ANKLE STIFFNESS, RIGHT: ICD-10-CM

## 2025-02-18 DIAGNOSIS — I89.0 LYMPHEDEMA OF RIGHT LOWER EXTREMITY: Primary | ICD-10-CM

## 2025-02-18 DIAGNOSIS — M62.81 MUSCLE WEAKNESS (GENERALIZED): ICD-10-CM

## 2025-02-18 PROCEDURE — 97140 MANUAL THERAPY 1/> REGIONS: CPT | Mod: GO

## 2025-02-18 PROCEDURE — 97535 SELF CARE MNGMENT TRAINING: CPT | Mod: GO

## 2025-02-18 ASSESSMENT — ACTIVITIES OF DAILY LIVING (ADL): HOME_MANAGEMENT_TIME_ENTRY: 30

## 2025-02-18 NOTE — PROGRESS NOTES
Occupational Therapy    Occupational Therapy Treatment    Name: Cady Neal  MRN: [id number]  : 1955  Date: 2025    Time Entry:  Time Calculation  Start Time: 830  Stop Time: 925  Time Calculation (min): 55 min        OT Therapeutic Procedures Time Entry  Manual Therapy Time Entry: 25  Self Care/Home Management (ADLs) Time Entry: 30                  Visit #10  Re-assessment    1. Lymphedema of right lower extremity        2. Lower extremity pain, right        3. Ankle stiffness, right        4. Muscle weakness (generalized)                 Assessment:  RLE tissue texture softening post manual lymph drainage (MLD) tx.  Pt received her pump and is using it at home.  Pt measured for compression stockings, good fit of right leg compression stocking worn today.  Donning techniques refined.  Pt to add left leg compression stockings.  Will assess.    Plan:  Upgraded goals:  Pt will  -Demonstrate decreased/stable swelling and softened tissue texture in RLE, monitor LLE, upon  to increase safe functional mobility, ADLS. IADLS, and leisure activities.  -demonstrate decreased/stable circumferential measurements right lower extremity/BLEs.  -Demonstrate consistent application of knowledge with lymphedema skin care precautions to reduce the risk of infection and exacerbation.  -Demonstrate independence with the self manual lymph drainage (MLD) to enhance lymphatic flow and decongestion of trunk, right/both lower extremities.  Demonstrate good tolerance and technique with home pump use.  -Demonstrate good understanding/application of the principles and theory of lymphatic compression.  -Be fit with and demonstrate good tolerance to right lower extremity compression garment; 20-30 mmHg thigh high, 20-30 mmHg knee high left leg.  -Demonstrate independence with donning/doffing garment and adherence to wear schedule, adaptive techniques as needed.  -Improve LLIS score by 7-14 points by discharge.  -Decrease pain,  tightness lower extremities.  -Improve right LE functional ROM and strength as needed for safe functional mobility.  -Demonstrate independence with home exercise program and compliance with lymphedema exercise precautions.      Continue OT  for trunk, RLE/BLE lymphedema CDT (complete decongestive therapy), decrease swelling, improve tissue texture, improve mobility and ease of functional mobility, ADLS, IADLS.  Instruct, refine home managent.  Freq 1x/week  Duration 8-12 weeks    Subjective   General:  Pt received her lymphedema pump last week, reports it is going well.  Pt reports she was ill with GI and respiratory symptoms, is doing better, still fatigued.  Pt measured for right leg compression, has basic pair, waiting for measured pair (on order).    Precautions:  STEADI Fall Risk Score (The score of 4 or more indicates an increased risk of falling): 5  Pt has h/o kidney transplant.    Pain Assessment:   Pt denies pain right leg.   Pt reports constant low/mid back pain, reports better.    Objective    ROM:   R ankles tight, stiff.   Mild stiffness left ankle.  Strength:   Decreased standing, walking tolerance, improvement reported.   Sensation:   Intact RLE, pt reports numbness.  Lower Extremity (Skin Appearance/Condition and Girth):   Dryness    Fibrotic edema right lower leg, ankle, foot   Hyperkeratosis ankle, top of toes, mild   Hyperpigmentation pink right lower leg, ankles, foot   + Stemmer Sign right 2nd toe (left negative)   Additional Information:   Softened tissue texture noted BLEs.    Lower Extremity Girth Measurements:   RLE cm  Superior border of patella (SBP) 41.0  10 cm above SBP 50.0  15 cm above SBP -  10cm below SBP 37.5  20cm below SBP 39.1  30cm below SBP 30.2  35cm below SBP 26.3  Ankle lobule 26.9  Ankle 24.1  Forefoot 20.7    LLE cm  Superior border of patella (SBP) 37.7  10 cm above SBP 44.9  15 cm above SBP -  10cm below SBP 34.2  20cm below SBP 36.3  30cm below SBP 29.2  35cm below SBP  24.5  Ankle lobule 23.0  Ankle 22.1  Forefoot 20.0  Decreases since last session.    Initial measurements 12/12/2024:  RLE cm  Superior border of patella (SBP) 43.3  10 cm above SBP 52.5  15 cm above SBP 55.8  10cm below SBP 38.6  20cm below SBP 41.9  30cm below SBP 33.1  35cm below SBP 29.0  Ankle lobule 28.8  Ankle 26.1  Forefoot 21.6    LLE cm  Superior border of patella (SBP) 40.1  10 cm above SBP 48.8  15 cm above SBP 53.1  10cm below SBP 34.2  20cm below SBP 38.6  30cm below SBP 30.7  35cm below SBP 26.8  Ankle lobule 24.6  Ankle 23.2  Forefoot 20.3          Treatment:  55 minutes    Manual Therapy 25  OT provide manual lymph drainage (MLD), clear trunk, RLE.  Gentle soft tissue techniques with MLD distal leg, ankle.  RLE tissue texture softening post tx.    Self Care 30  OT assess skin, tissue texture, take and assess measurements.  Decrease in measurements noted.  Pt was measured for compression stockings by Aziza at [facility] .  Pt fit for right leg thigh high, 20-30 mmHg. Received stocking, ordered additional.  Pt fit for left leg knee high, OT obtained prescription form physician.   Will assess.  OT instructed donning techniques for right leg stocking, recommend gloves for better .  OT instruct, demonstrate correct positioning strategies.  Good fit noted.

## 2025-02-21 ENCOUNTER — SPECIALTY PHARMACY (OUTPATIENT)
Dept: PHARMACY | Facility: CLINIC | Age: 70
End: 2025-02-21

## 2025-02-21 DIAGNOSIS — N39.0 RECURRENT UTI: ICD-10-CM

## 2025-02-24 ENCOUNTER — APPOINTMENT (OUTPATIENT)
Dept: OCCUPATIONAL THERAPY | Facility: CLINIC | Age: 70
End: 2025-02-24
Payer: MEDICARE

## 2025-02-24 DIAGNOSIS — I89.0 LYMPHEDEMA OF RIGHT LOWER EXTREMITY: Primary | ICD-10-CM

## 2025-02-24 DIAGNOSIS — M79.604 LOWER EXTREMITY PAIN, RIGHT: ICD-10-CM

## 2025-02-24 DIAGNOSIS — M62.81 MUSCLE WEAKNESS (GENERALIZED): ICD-10-CM

## 2025-02-24 DIAGNOSIS — M25.671 ANKLE STIFFNESS, RIGHT: ICD-10-CM

## 2025-02-25 PROCEDURE — RXMED WILLOW AMBULATORY MEDICATION CHARGE

## 2025-02-28 DIAGNOSIS — N39.0 RECURRENT UTI: ICD-10-CM

## 2025-03-04 ENCOUNTER — TREATMENT (OUTPATIENT)
Dept: OCCUPATIONAL THERAPY | Facility: CLINIC | Age: 70
End: 2025-03-04
Payer: MEDICARE

## 2025-03-04 ENCOUNTER — SPECIALTY PHARMACY (OUTPATIENT)
Dept: PHARMACY | Facility: CLINIC | Age: 70
End: 2025-03-04

## 2025-03-04 DIAGNOSIS — M25.671 ANKLE STIFFNESS, RIGHT: ICD-10-CM

## 2025-03-04 DIAGNOSIS — M62.81 MUSCLE WEAKNESS (GENERALIZED): ICD-10-CM

## 2025-03-04 DIAGNOSIS — M79.604 LOWER EXTREMITY PAIN, RIGHT: ICD-10-CM

## 2025-03-04 DIAGNOSIS — I89.0 LYMPHEDEMA OF RIGHT LOWER EXTREMITY: Primary | ICD-10-CM

## 2025-03-04 PROCEDURE — 97140 MANUAL THERAPY 1/> REGIONS: CPT | Mod: GO

## 2025-03-04 PROCEDURE — 97535 SELF CARE MNGMENT TRAINING: CPT | Mod: GO

## 2025-03-04 ASSESSMENT — ACTIVITIES OF DAILY LIVING (ADL): HOME_MANAGEMENT_TIME_ENTRY: 28

## 2025-03-04 NOTE — PROGRESS NOTES
Occupational Therapy    Occupational Therapy Treatment    Name: Cady Neal  MRN: [id number]  : 1955  Date: 25    Time Entry:  Time Calculation  Start Time: 1300  Stop Time: 1348  Time Calculation (min): 48 min        OT Therapeutic Procedures Time Entry  Manual Therapy Time Entry: 20  Self Care/Home Management (ADLs) Time Entry: 28              Visit #11  Discharge    1. Lymphedema of right lower extremity        2. Lower extremity pain, right        3. Ankle stiffness, right        4. Muscle weakness (generalized)                 Assessment:  RLE tissue texture softening post manual lymph drainage (MLD) tx.  Pt received her pump and is using it at home.  Pt received compression stockings for both legs (thigh highs, 20-30 mmHg); good fit and containment noted.  Pt following HEPs.  Goals met.  No further skilled OT indicated at this time.  Pt agreeable to discharge today.    Plan:  Discharge OT lymphedema tx.    Subjective   General:  Pt received her lymphedema pump last week, reports it is going well.  Pt received her compression stockings (thigh highs), wearing daily.  Pt was on feet throughout the whole weekend.  Rested in evenings.    Precautions:  STEADI Fall Risk Score (The score of 4 or more indicates an increased risk of falling): 5  Pt has h/o kidney transplant.    Pain Assessment:   Pt denies pain right leg.   Pt reports constant low/mid back pain, reports better.    Objective    ROM:   R ankles full, tightness.   Mild stiffness left ankle.  Strength:   Decreased standing, walking tolerance, improvement reported.   Sensation:   Intact RLE, pt reports numbness.  Lower Extremity (Skin Appearance/Condition and Girth):   Dryness    Fibrotic edema right lower leg, ankle, foot, shows softening.  Hyperkeratosis ankle, top of toes, mild   Hyperpigmentation pink right lower leg, ankles, foot   + Stemmer Sign right 2nd toe (left negative)   Additional Information:   Softened tissue texture noted  BLEs.    Lower Extremity Girth Measurements:   RLE cm  Superior border of patella (SBP) 41.0  10 cm above SBP 51.0  15 cm above SBP -  10cm below SBP 37.0  20cm below SBP 39.5  30cm below SBP 29.0  35cm below SBP 28.2  Ankle lobule 26.5  Ankle 24.2  Forefoot 19.5    LLE cm  Superior border of patella (SBP) 37.1  10 cm above SBP 46.3  15 cm above SBP -  10cm below SBP 33.4  20cm below SBP 37.4  30cm below SBP 31.2  35cm below SBP 25.4  Ankle lobule 23.7  Ankle 22.1  Forefoot 19.1  Fluctuations noted. Stable.    Initial measurements 12/12/2024:  RLE cm  Superior border of patella (SBP) 43.3  10 cm above SBP 52.5  15 cm above SBP 55.8  10cm below SBP 38.6  20cm below SBP 41.9  30cm below SBP 33.1  35cm below SBP 29.0  Ankle lobule 28.8  Ankle 26.1  Forefoot 21.6    LLE cm  Superior border of patella (SBP) 40.1  10 cm above SBP 48.8  15 cm above SBP 53.1  10cm below SBP 34.2  20cm below SBP 38.6  30cm below SBP 30.7  35cm below SBP 26.8  Ankle lobule 24.6  Ankle 23.2  Forefoot 20.3          Treatment:  48 minutes    Manual Therapy 20  OT provide manual lymph drainage (MLD), clear trunk, RLE.  Gentle soft tissue techniques with MLD distal leg, ankle.  RLE tissue texture softening post tx.    Self Care 28  OT assess skin, tissue texture, take and assess measurements. See above.  Pt received compression stockings by Aziza at [facility], thigh high, 20-30 mmHg. Pt wearing stockings daily, reports good fit and comfort.  OT review donning and positioning of stockings.  Pt showed, reports good ability.  OT review MLD, home pump use.  Pt clears trunk before pump use.  Pt reports feeling comfortable with home management.

## 2025-03-05 ENCOUNTER — PHARMACY VISIT (OUTPATIENT)
Dept: PHARMACY | Facility: CLINIC | Age: 70
End: 2025-03-05
Payer: COMMERCIAL

## 2025-03-06 ENCOUNTER — ANESTHESIA EVENT (OUTPATIENT)
Dept: OPERATING ROOM | Facility: CLINIC | Age: 70
End: 2025-03-06
Payer: MEDICARE

## 2025-03-06 RX ORDER — LIDOCAINE HYDROCHLORIDE 10 MG/ML
0.1 INJECTION, SOLUTION EPIDURAL; INFILTRATION; INTRACAUDAL; PERINEURAL ONCE
OUTPATIENT
Start: 2025-03-06 | End: 2025-03-06

## 2025-03-06 RX ORDER — ONDANSETRON HYDROCHLORIDE 2 MG/ML
4 INJECTION, SOLUTION INTRAVENOUS ONCE AS NEEDED
OUTPATIENT
Start: 2025-03-06

## 2025-03-06 RX ORDER — SODIUM CHLORIDE, SODIUM LACTATE, POTASSIUM CHLORIDE, CALCIUM CHLORIDE 600; 310; 30; 20 MG/100ML; MG/100ML; MG/100ML; MG/100ML
75 INJECTION, SOLUTION INTRAVENOUS CONTINUOUS
OUTPATIENT
Start: 2025-03-07 | End: 2025-03-07

## 2025-03-07 ENCOUNTER — ANESTHESIA (OUTPATIENT)
Dept: OPERATING ROOM | Facility: CLINIC | Age: 70
End: 2025-03-07
Payer: MEDICARE

## 2025-03-07 ENCOUNTER — HOSPITAL ENCOUNTER (OUTPATIENT)
Dept: OPERATING ROOM | Facility: CLINIC | Age: 70
Discharge: HOME | End: 2025-03-07
Payer: MEDICARE

## 2025-03-07 VITALS
RESPIRATION RATE: 20 BRPM | HEIGHT: 63 IN | HEART RATE: 72 BPM | BODY MASS INDEX: 28.91 KG/M2 | DIASTOLIC BLOOD PRESSURE: 70 MMHG | SYSTOLIC BLOOD PRESSURE: 116 MMHG | OXYGEN SATURATION: 99 % | TEMPERATURE: 97 F | WEIGHT: 163.14 LBS

## 2025-03-07 DIAGNOSIS — R19.7 DIARRHEA, UNSPECIFIED TYPE: Primary | ICD-10-CM

## 2025-03-07 DIAGNOSIS — N39.0 RECURRENT UTI: ICD-10-CM

## 2025-03-07 DIAGNOSIS — Z86.0100 HISTORY OF COLON POLYPS: ICD-10-CM

## 2025-03-07 DIAGNOSIS — Z12.11 COLON CANCER SCREENING: ICD-10-CM

## 2025-03-07 PROCEDURE — 3700000001 HC GENERAL ANESTHESIA TIME - INITIAL BASE CHARGE: Performed by: ANESTHESIOLOGY

## 2025-03-07 PROCEDURE — 7100000009 HC PHASE TWO TIME - INITIAL BASE CHARGE: Performed by: ANESTHESIOLOGY

## 2025-03-07 PROCEDURE — 7100000010 HC PHASE TWO TIME - EACH INCREMENTAL 1 MINUTE: Performed by: ANESTHESIOLOGY

## 2025-03-07 PROCEDURE — 2500000004 HC RX 250 GENERAL PHARMACY W/ HCPCS (ALT 636 FOR OP/ED): Mod: TB | Performed by: NURSE ANESTHETIST, CERTIFIED REGISTERED

## 2025-03-07 PROCEDURE — 3600000007 HC OR TIME - EACH INCREMENTAL 1 MINUTE - PROCEDURE LEVEL TWO: Performed by: ANESTHESIOLOGY

## 2025-03-07 PROCEDURE — 3600000002 HC OR TIME - INITIAL BASE CHARGE - PROCEDURE LEVEL TWO: Performed by: ANESTHESIOLOGY

## 2025-03-07 PROCEDURE — 2500000004 HC RX 250 GENERAL PHARMACY W/ HCPCS (ALT 636 FOR OP/ED): Performed by: NURSE ANESTHETIST, CERTIFIED REGISTERED

## 2025-03-07 PROCEDURE — 3700000002 HC GENERAL ANESTHESIA TIME - EACH INCREMENTAL 1 MINUTE: Performed by: ANESTHESIOLOGY

## 2025-03-07 PROCEDURE — 45380 COLONOSCOPY AND BIOPSY: CPT | Performed by: INTERNAL MEDICINE

## 2025-03-07 PROCEDURE — 45385 COLONOSCOPY W/LESION REMOVAL: CPT | Performed by: INTERNAL MEDICINE

## 2025-03-07 RX ORDER — SODIUM CHLORIDE, SODIUM LACTATE, POTASSIUM CHLORIDE, CALCIUM CHLORIDE 600; 310; 30; 20 MG/100ML; MG/100ML; MG/100ML; MG/100ML
INJECTION, SOLUTION INTRAVENOUS CONTINUOUS PRN
Status: DISCONTINUED | OUTPATIENT
Start: 2025-03-07 | End: 2025-03-07

## 2025-03-07 RX ORDER — ONDANSETRON HYDROCHLORIDE 2 MG/ML
INJECTION, SOLUTION INTRAVENOUS AS NEEDED
Status: DISCONTINUED | OUTPATIENT
Start: 2025-03-07 | End: 2025-03-07

## 2025-03-07 RX ORDER — GLYCOPYRROLATE 0.2 MG/ML
INJECTION INTRAMUSCULAR; INTRAVENOUS AS NEEDED
Status: DISCONTINUED | OUTPATIENT
Start: 2025-03-07 | End: 2025-03-07

## 2025-03-07 RX ORDER — PROPOFOL 10 MG/ML
INJECTION, EMULSION INTRAVENOUS CONTINUOUS PRN
Status: DISCONTINUED | OUTPATIENT
Start: 2025-03-07 | End: 2025-03-07

## 2025-03-07 RX ORDER — LIDOCAINE HYDROCHLORIDE 20 MG/ML
INJECTION, SOLUTION INFILTRATION; PERINEURAL AS NEEDED
Status: DISCONTINUED | OUTPATIENT
Start: 2025-03-07 | End: 2025-03-07

## 2025-03-07 RX ADMIN — PROPOFOL 300 MCG/KG/MIN: 10 INJECTION, EMULSION INTRAVENOUS at 11:58

## 2025-03-07 RX ADMIN — PROPOFOL 30 MG: 10 INJECTION, EMULSION INTRAVENOUS at 11:59

## 2025-03-07 RX ADMIN — GLYCOPYRROLATE 0.1 MG: 0.2 INJECTION INTRAMUSCULAR; INTRAVENOUS at 12:01

## 2025-03-07 RX ADMIN — LIDOCAINE HYDROCHLORIDE 50 MG: 20 INJECTION, SOLUTION INFILTRATION; PERINEURAL at 11:58

## 2025-03-07 RX ADMIN — PROPOFOL 20 MG: 10 INJECTION, EMULSION INTRAVENOUS at 12:08

## 2025-03-07 RX ADMIN — SODIUM CHLORIDE, POTASSIUM CHLORIDE, SODIUM LACTATE AND CALCIUM CHLORIDE: 600; 310; 30; 20 INJECTION, SOLUTION INTRAVENOUS at 11:58

## 2025-03-07 RX ADMIN — ONDANSETRON 4 MG: 2 INJECTION INTRAMUSCULAR; INTRAVENOUS at 12:01

## 2025-03-07 RX ADMIN — PROPOFOL 40 MG: 10 INJECTION, EMULSION INTRAVENOUS at 12:27

## 2025-03-07 SDOH — HEALTH STABILITY: MENTAL HEALTH: CURRENT SMOKER: 0

## 2025-03-07 ASSESSMENT — PAIN SCALES - GENERAL
PAINLEVEL_OUTOF10: 0 - NO PAIN
PAIN_LEVEL: 0

## 2025-03-07 ASSESSMENT — PAIN - FUNCTIONAL ASSESSMENT
PAIN_FUNCTIONAL_ASSESSMENT: 0-10

## 2025-03-07 ASSESSMENT — COLUMBIA-SUICIDE SEVERITY RATING SCALE - C-SSRS
1. IN THE PAST MONTH, HAVE YOU WISHED YOU WERE DEAD OR WISHED YOU COULD GO TO SLEEP AND NOT WAKE UP?: NO
2. HAVE YOU ACTUALLY HAD ANY THOUGHTS OF KILLING YOURSELF?: NO
6. HAVE YOU EVER DONE ANYTHING, STARTED TO DO ANYTHING, OR PREPARED TO DO ANYTHING TO END YOUR LIFE?: NO

## 2025-03-07 NOTE — ANESTHESIA POSTPROCEDURE EVALUATION
Patient: Cady Neal    Procedure Summary       Date: 03/07/25 Room / Location: [facility] OR    Anesthesia Start: 1154 Anesthesia Stop: 1250    Procedure: COLONOSCOPY Diagnosis: Colon cancer screening    Scheduled Providers: Nba Wilcox MD Responsible Provider: Leo Jessica MD    Anesthesia Type: MAC ASA Status: 2            Anesthesia Type: MAC    Vitals Value Taken Time   /68 03/07/25 1250   Temp 36.7 03/07/25 1250   Pulse 70 03/07/25 1250   Resp 16 03/07/25 1250   SpO2 97 03/07/25 1250       Anesthesia Post Evaluation    Patient location during evaluation: PACU  Patient participation: complete - patient participated  Level of consciousness: awake  Pain score: 0  Pain management: adequate  Airway patency: patent  Cardiovascular status: acceptable  Respiratory status: acceptable  Hydration status: acceptable  Postoperative Nausea and Vomiting: none        No notable events documented.

## 2025-03-07 NOTE — DISCHARGE INSTRUCTIONS
During the first 24 hours after your procedure, you should:    - Resume normal diet, unless otherwise directed by your doctor.  - Resume your home medications, unless otherwise directed by your doctor.  - Refrain from driving or operative heavy machinery.  - Drink plenty of liquids.  - Avoid consuming alcohol.  - Avoid strenuous activity or heavy lifting.    After 24 hours, you can resume regular activity.    Call your doctor office immediately ([phone]) or come to the nearest emergency room if you experience:    - Abdominal tenderness  - Blood in your stool or vomit  - Difficulty urinating or passing stools  - Difficulty breathing  - Chest pain  - Fever

## 2025-03-07 NOTE — H&P
Subjective     History of Present Illness:   Cady Neal is a 69 y.o. female with ESRD s/p kidney transplant, recurrent UTI, and history of colon polyps who presented for surveillance colonoscopy.  She had 2 episodes of diarrhea in the past year; last one was in September.  Her diarrhea resolves on its own after 1-2 days.  She denies having any GI symptoms currently.  Last colonoscopy over 10 years ago reportedly showed polyps.    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     Social History   reports that she has never smoked. She has never used smokeless tobacco. She reports current alcohol use. She reports that she does not use drugs.     Family History  family history includes Skin cancer in her brother.     Allergies  No Known Allergies    Medications  Current Outpatient Medications   Medication Instructions    Ca-D3-mag-zinc--colette-boron (Caltrate 600-D Plus Minerals) 600 mg calcium- 800 unit-40 mg tablet,chewable Every 12 hours    dicyclomine (Bentyl) 10 mg capsule Every 12 hours    DULoxetine (Cymbalta) 30 mg DR capsule 1 capsule, Every 24 hours    fluorouracil (Efudex) 5 % cream cream Topical, 2 times daily, Apply to the affected sharon of the forehead twice daily for a total of 2 weeks. Wash hands thoroughly after each application.    mycophenolate (Cellcept) 250 mg  "capsule TAKE TWO (2) CAPSULES BY MOUTH TWICE DAILY.    omeprazole (PRILOSEC) 20 mg, 2 times daily    Sutab 1.479-0.188- 0.225 gram tablet Take by mouth. Use as directed    tacrolimus (Prograf) 1 mg capsule TAKE ONE (1) CAPSULE BY MOUTH TWICE DAILY.        Objective   Visit Vitals  /83   Pulse 86   Temp 36.4 °C (97.5 °F) (Temporal)   Resp 16        Physical Exam  General: not in acute distress  CV: regular rate and rhythm  Resp: non-labored breathing  GI: soft, active bowel sounds, non-tender to palpation, no rebound or guarding  Msk: moving all extremities   Derm: no jaundice    Labs  Lab Results   Component Value Date    WBC 5.8 02/03/2025    HGB 12.3 02/03/2025    HCT 39.7 02/03/2025    MCV 92 02/03/2025     02/03/2025     Lab Results   Component Value Date    GLUCOSE 99 02/03/2025    CALCIUM 9.3 02/03/2025     02/03/2025    K 4.2 02/03/2025    CO2 29 02/03/2025     02/03/2025    BUN 22 02/03/2025    CREATININE 0.86 02/03/2025     No results found for: \"ALT\", \"AST\", \"GGT\", \"ALKPHOS\", \"BILITOT\"  No results found for: \"INR\", \"PROTIME\"    Assessment/Plan   Cady Neal is a 69 y.o. female with ESRD s/p kidney transplant, recurrent UTI, and history of colon polyps who presented for surveillance colonoscopy.         Nba Wilcox MD  "

## 2025-03-07 NOTE — ANESTHESIA PREPROCEDURE EVALUATION
Patient: Cady Neal    Procedure Information       Anesthesia Start Date/Time: 03/07/25 1154    Scheduled providers: Nba Wilcox MD    Procedure: COLONOSCOPY    Location: [facility] OR            Relevant Problems   No relevant active problems       Clinical information reviewed:   Tobacco  Allergies  Meds   Med Hx  Surg Hx  OB Status  Fam Hx  Soc   Hx        NPO Detail:  NPO/Void Status  Date of Last Liquid: 03/07/25  Time of Last Liquid: 0630  Date of Last Solid: 03/06/25  Time of Last Solid: 0800         Physical Exam    Airway  Mallampati: II  TM distance: >3 FB  Neck ROM: full     Cardiovascular - normal exam     Dental - normal exam     Pulmonary - normal exam     Abdominal            Anesthesia Plan    History of general anesthesia?: yes  History of complications of general anesthesia?: no    ASA 2     MAC     The patient is not a current smoker.    intravenous induction   Anesthetic plan and risks discussed with patient and spouse.    Plan discussed with attending.

## 2025-03-12 ENCOUNTER — APPOINTMENT (OUTPATIENT)
Dept: DERMATOLOGY | Facility: CLINIC | Age: 70
End: 2025-03-12
Payer: MEDICARE

## 2025-03-12 VITALS — SYSTOLIC BLOOD PRESSURE: 137 MMHG | DIASTOLIC BLOOD PRESSURE: 84 MMHG | HEART RATE: 73 BPM

## 2025-03-12 DIAGNOSIS — C44.92 SQUAMOUS CELL CARCINOMA OF SKIN: ICD-10-CM

## 2025-03-12 PROCEDURE — 17311 MOHS 1 STAGE H/N/HF/G: CPT | Performed by: DERMATOLOGY

## 2025-03-12 PROCEDURE — 14061 TIS TRNFR E/N/E/L10.1-30SQCM: CPT | Performed by: DERMATOLOGY

## 2025-03-12 PROCEDURE — 17312 MOHS ADDL STAGE: CPT | Performed by: DERMATOLOGY

## 2025-03-12 NOTE — PROGRESS NOTES
Office Visit Note  Date: 3/12/2025  Surgeon:  Horace Catherine MD PhD  Office Location: [address] [address]  Dept: [phone]  Dept Fax: [phone]  Referring Provider: Daxa Sosa MD  [address] [place] [address]  [address], [address],  [facility]    Subjective   Cady Neal is a 69 y.o. female who presents for the following: MOHS Surgery    According to the patient, the lesion has been present for approximately 6 months at the time of diagnosis.  The lesion is painful.  The lesion has not been treated previously.    The patient does not have a pacemaker / defibrillator.  The patient does not have a heart valve / joint replacement.    The patient is not on blood thinners.  The patient does not have a history of hepatitis B or C.  The patient does not have a history of HIV.  The patient does have a history of immunosuppression (e.g. organ transplantation, malignancy, medications)    Review of Systems:  No other skin or systemic complaints other than what is documented elsewhere in the note.    MEDICAL HISTORY: clinically relevant history including significant past medical history, medications and allergies was reviewed and documented in Epic.    Objective   Well appearing patient in no apparent distress; mood and affect are within normal limits.  Vital signs: See record.  Noted on the Left Nasal Sidewall  Is a 0.7 x 0.5 cm scar        The patient confirmed the identified site.    Discussion:  The nature of the diagnosis was explained. The lesion is a skin cancer.  It has a risk of local growth and distant spread. The condition is associated with sun exposure.  Warning signs of non-melanoma skin cancer discussed. Patient was instructed to perform monthly self skin examination.  We recommended that the patient have regular full skin exams given an increased risk of subsequent skin cancers. The patient was instructed to use sun protective behaviors  including use of broad spectrum sunscreens and sun protective clothing to reduce risk of skin cancers.      Risks, benefits, side effects of Mohs surgery were discussed with patient and the patient voiced understanding.  It was explained that even though the cure rate of Mohs is very high it is not 100%. Risks of surgery including but not limited to bleeding, infection, numbness, nerve damage, and scar were reviewed.  Discussion included wound care requirements, activity restrictions, likely scar outcome and time to heal.     After Mohs surgery, the defect may need to be repaired surgically and the scar may be longer than the original lesion.  Reconstruction options, risks, and benefits were reviewed including second intention healing, linear repair (4-1 ratio was explained), local flaps, skin grafts, cartilage grafts and interpolation flaps (the need for multiple surgeries was explained). Possible outcomes were reviewed including likely scar appearance, failure of flap survival, infection, bleeding and the need for revision surgery.     The pathology was reviewed, the photograph was reviewed, and the referring physician's note was reviewed.    Patient elected for Mohs surgery.

## 2025-03-12 NOTE — PROGRESS NOTES
Mohs Surgery Operative Note    Date of Surgery:  3/12/2025  Surgeon:  Horace Catherine MD PhD  Office Location: [address] [address]  Dept: [phone]  Dept Fax: [phone]  Referring Provider: Daxa Sosa MD  [address] [place] [address]  [address], [address] [address]      Assessment/Plan   Pre-procedure:   Obtained informed consent: written from patient  The surgical site was identified and confirmed with the patient.     Intra-operative:   Audible time out called at : 2:13 PM 03/12/25  by: Raquel Peña RN   Verified patient name, birthdate, site, specimen bottle label & requisition.    The planned procedure(s) was again reviewed with the patient. The risks of bleeding, infection, nerve damage and scarring were reviewed. Written authorization was obtained. The patient identity, surgical site, and planned procedure(s) were verified. The provider acted as both surgeon and pathologist.     Squamous cell carcinoma of skin  Left Nasal Sidewall    Mohs surgery    Consent obtained: written    Universal Protocol:  Procedure explained and questions answered to patient or proxy's satisfaction: Yes    Test results available and properly labeled: Yes    Pathology report reviewed: Yes    External notes reviewed: Yes    Photo or diagram used for site identification: Yes    Site/side marked: Yes    Slide independently reviewed by Mohs surgeon: Yes    Immediately prior to procedure a time out was called: Yes    Patient identity confirmed: verbally with patient  Preparation: Patient was prepped and draped in usual sterile fashion      Anticoagulation:  Is the patient taking prescription anticoagulant and/or aspirin prescribed/recommended by a physician? No    Was the anticoagulation regimen changed prior to Mohs? No      Anesthesia:  Anesthesia method: local infiltration  Local anesthetic: lidocaine 1% WITH epi    Procedure Details:  Case ID Number:  [id number]  Biopsy accession number: [id number]  Date of biopsy: 1/20/2025  Frozen section biopsy performed: No    Specimen debulked: No    Pre-Op diagnosis: squamous cell carcinoma  Other pre-op diagnosis: invasive  SCC subtype: moderately differentiated (with acantholysis)  Surgical site (from skin exam): Left Nasal Sidewall  Pre-operative length (cm): 0.7  Pre-operative width (cm): 0.5  Indications for Mohs surgery: anatomic location where tissue conservation is critical  Previously treated? No      Micrographic Surgery Details:  Post-operative length (cm): 1.5  Post-operative width (cm): 1.7  Number of Mohs stages: 2    Stage 1     Comments:   The patient was brought into the operating room and placed in the procedure chair in the appropriate position.  The area positive by previous biopsy was identified and confirmed with the patient. The area of clinically obvious tumor was debulked using a curette and/or scalpel as needed. An incision was made following the Mohs approach through the skin. The specimen was taken to the lab, divided into 2 piece(s) and appropriately chromacoded and processed.    .  Tumor was seen on the lateral margins as indicated on the on the Mohs map.  Squamous cell carcinoma in situ. Histologic examination revealed enlarged, atypical keratinocytes with large nuclear to cytoplasmic ratio extending throughout the full thickness of an epidermis.              Tumor features identified on Mohs section: squamous cell carcinoma    Stage 2     Comments:   The patient was brought into the operating room and placed in the procedure chair in the appropriate position.  The area positive by previous biopsy was identified and confirmed with the patient. The area of clinically obvious tumor was debulked using a curette and/or scalpel as needed. An incision was made following the Mohs approach through the skin. The specimen was taken to the lab, divided into 1 piece(s) and appropriately chromacoded and  processed.               Tumor features identified on Mohs section: no tumor identified    Depth of defect: subcutaneous fat    Patient tolerance of procedure: tolerated well, no immediate complications    Reconstruction:  Was the defect reconstructed? Yes    Was reconstruction performed by the same Mohs surgeon? Yes    Setting of reconstruction: outpatient office  When was reconstruction performed? same day  Type of reconstruction: flap  Type of flap: transposition    Transposition flap type: melolabial  Flap area (cm2): 15  Subcutaneous Layers (Deep Stitches)   Suture size:  5-0  Suture type:  Vicryl  Stitches:  Buried vertical mattress  Fine/surface layer approximation (top stitches)   Epidermal/Superficial suture size:  5-0  Epidermal/Superficial suture type:  Fast-absorbing gut  Stitches: simple running    Hemostasis achieved with: electrodesiccation  Outcome: patient tolerated procedure well with no complications    Post-procedure details: sterile dressing applied and wound care instructions given    Dressing type: pressure dressing, Hypafix, petrolatum and Telfa pad            Transposition Flap:  Due to geometric and functional constraints, a flap reconstruction was performed to reconstruct the defect. To that end, adjacent tissue was incised and carried over to close the defect in the following manner: Transposition flap Using skin marking ink, a transposition flap was designed to repair the defect and minimize functional and cosmetic distortion. Given the size, depth, and location of the defect, the surrounding structures and local tissue laxity, it was felt that a transposition flap was necessary to provide the best restoration of normal anatomy and function of the skin. The risks, benefits and likely outcome of the flap were discussed. The wound edges were refreshed to a 90 degree angle. The flap was cut and undermined extensively at the level of the subcutaneous plane. Standing cutaneous cones were  removed using Burow's triangles. The flap was elevated and through closure of the secondary/tertiary defect the flap was transposed into the primary defect using multiple key deep sutures. The flap was trimmed where needed for a more accurate fit. The remainder of the flap was then affixed with epidermal sutures. The flap measured 15 cm2.               Wound care was discussed, and the patient was given written post-operative wound care instructions.      The patient will follow up with Horace Catherine MD PhD as needed for any post operative problems or concerns, and will follow up with their primary dermatologist as scheduled.

## 2025-03-14 DIAGNOSIS — N39.0 RECURRENT UTI: ICD-10-CM

## 2025-03-17 LAB
LABORATORY COMMENT REPORT: NORMAL
PATH REPORT.FINAL DX SPEC: NORMAL
PATH REPORT.GROSS SPEC: NORMAL
PATH REPORT.TOTAL CANCER: NORMAL

## 2025-03-21 DIAGNOSIS — N39.0 RECURRENT UTI: ICD-10-CM

## 2025-03-27 ENCOUNTER — SPECIALTY PHARMACY (OUTPATIENT)
Dept: PHARMACY | Facility: CLINIC | Age: 70
End: 2025-03-27

## 2025-03-27 DIAGNOSIS — E21.3 HYPERPARATHYROIDISM (MULTI): ICD-10-CM

## 2025-03-27 DIAGNOSIS — Z79.899 IMMUNOSUPPRESSIVE MANAGEMENT ENCOUNTER FOLLOWING KIDNEY TRANSPLANT: ICD-10-CM

## 2025-03-27 DIAGNOSIS — Z94.0 KIDNEY REPLACED BY TRANSPLANT (HHS-HCC): ICD-10-CM

## 2025-03-27 DIAGNOSIS — E55.9 VITAMIN D DEFICIENCY: ICD-10-CM

## 2025-03-27 DIAGNOSIS — Z94.0 IMMUNOSUPPRESSIVE MANAGEMENT ENCOUNTER FOLLOWING KIDNEY TRANSPLANT: ICD-10-CM

## 2025-03-27 DIAGNOSIS — I10 ESSENTIAL HYPERTENSION: ICD-10-CM

## 2025-03-28 DIAGNOSIS — N39.0 RECURRENT UTI: ICD-10-CM

## 2025-03-31 PROCEDURE — RXMED WILLOW AMBULATORY MEDICATION CHARGE

## 2025-03-31 RX ORDER — TACROLIMUS 1 MG/1
CAPSULE ORAL
Qty: 60 CAPSULE | Refills: 11 | Status: SHIPPED | OUTPATIENT
Start: 2025-03-31 | End: 2026-03-31

## 2025-03-31 RX ORDER — MYCOPHENOLATE MOFETIL 250 MG/1
500 CAPSULE ORAL 2 TIMES DAILY
Qty: 120 CAPSULE | Refills: 11 | Status: SHIPPED | OUTPATIENT
Start: 2025-03-31 | End: 2026-03-31

## 2025-04-04 ENCOUNTER — PHARMACY VISIT (OUTPATIENT)
Dept: PHARMACY | Facility: CLINIC | Age: 70
End: 2025-04-04
Payer: COMMERCIAL

## 2025-04-04 DIAGNOSIS — N39.0 RECURRENT UTI: ICD-10-CM

## 2025-04-11 DIAGNOSIS — N39.0 RECURRENT UTI: ICD-10-CM

## 2025-04-18 DIAGNOSIS — N39.0 RECURRENT UTI: ICD-10-CM

## 2025-04-18 DIAGNOSIS — Z94.0 KIDNEY REPLACED BY TRANSPLANT (HHS-HCC): ICD-10-CM

## 2025-04-25 DIAGNOSIS — N39.0 RECURRENT UTI: ICD-10-CM

## 2025-04-29 ENCOUNTER — SPECIALTY PHARMACY (OUTPATIENT)
Dept: PHARMACY | Facility: CLINIC | Age: 70
End: 2025-04-29

## 2025-04-29 PROCEDURE — RXMED WILLOW AMBULATORY MEDICATION CHARGE

## 2025-05-02 DIAGNOSIS — N39.0 RECURRENT UTI: ICD-10-CM

## 2025-05-05 ENCOUNTER — PHARMACY VISIT (OUTPATIENT)
Dept: PHARMACY | Facility: CLINIC | Age: 70
End: 2025-05-05
Payer: COMMERCIAL

## 2025-05-09 DIAGNOSIS — N39.0 RECURRENT UTI: ICD-10-CM

## 2025-05-16 DIAGNOSIS — N39.0 RECURRENT UTI: ICD-10-CM

## 2025-05-17 NOTE — RESULT ENCOUNTER NOTE
Dear Cady,    The single polyp that was resected during your recent colonoscopy was a precancerous tubular adenoma.  Recommend repeat colonoscopy 7 years.  Biopsy was also obtained throughout your colon which showed no mucosal abnormality.  Follow-up in my clinic if you develop any gastrointestinal symptoms.    Sincerely,  Nba Wilcox MD

## 2025-05-23 DIAGNOSIS — N39.0 RECURRENT UTI: ICD-10-CM

## 2025-05-30 ENCOUNTER — SPECIALTY PHARMACY (OUTPATIENT)
Dept: PHARMACY | Facility: CLINIC | Age: 70
End: 2025-05-30

## 2025-05-30 DIAGNOSIS — N39.0 RECURRENT UTI: ICD-10-CM

## 2025-05-30 PROCEDURE — RXMED WILLOW AMBULATORY MEDICATION CHARGE

## 2025-06-02 ENCOUNTER — PHARMACY VISIT (OUTPATIENT)
Dept: PHARMACY | Facility: CLINIC | Age: 70
End: 2025-06-02
Payer: COMMERCIAL

## 2025-06-06 DIAGNOSIS — N39.0 RECURRENT UTI: ICD-10-CM

## 2025-06-13 DIAGNOSIS — N39.0 RECURRENT UTI: ICD-10-CM

## 2025-06-20 DIAGNOSIS — N39.0 RECURRENT UTI: ICD-10-CM

## 2025-06-26 ENCOUNTER — SPECIALTY PHARMACY (OUTPATIENT)
Dept: PHARMACY | Facility: CLINIC | Age: 70
End: 2025-06-26

## 2025-06-27 DIAGNOSIS — N39.0 RECURRENT UTI: ICD-10-CM

## 2025-06-27 PROCEDURE — RXMED WILLOW AMBULATORY MEDICATION CHARGE

## 2025-07-01 ENCOUNTER — PHARMACY VISIT (OUTPATIENT)
Dept: PHARMACY | Facility: CLINIC | Age: 70
End: 2025-07-01
Payer: COMMERCIAL

## 2025-07-04 DIAGNOSIS — N39.0 RECURRENT UTI: ICD-10-CM

## 2025-07-11 DIAGNOSIS — N39.0 RECURRENT UTI: ICD-10-CM

## 2025-07-11 DIAGNOSIS — Z94.0 KIDNEY REPLACED BY TRANSPLANT (HHS-HCC): ICD-10-CM

## 2025-07-18 DIAGNOSIS — N39.0 RECURRENT UTI: ICD-10-CM

## 2025-07-23 ENCOUNTER — APPOINTMENT (OUTPATIENT)
Dept: DERMATOLOGY | Facility: CLINIC | Age: 70
End: 2025-07-23
Payer: MEDICARE

## 2025-07-25 DIAGNOSIS — N39.0 RECURRENT UTI: ICD-10-CM

## 2025-07-28 PROCEDURE — RXMED WILLOW AMBULATORY MEDICATION CHARGE

## 2025-07-30 ENCOUNTER — SPECIALTY PHARMACY (OUTPATIENT)
Dept: PHARMACY | Facility: CLINIC | Age: 70
End: 2025-07-30

## 2025-07-31 ENCOUNTER — PHARMACY VISIT (OUTPATIENT)
Dept: PHARMACY | Facility: CLINIC | Age: 70
End: 2025-07-31
Payer: COMMERCIAL

## 2025-08-01 DIAGNOSIS — N39.0 RECURRENT UTI: ICD-10-CM

## 2025-08-08 DIAGNOSIS — N39.0 RECURRENT UTI: ICD-10-CM

## 2025-08-15 DIAGNOSIS — N39.0 RECURRENT UTI: ICD-10-CM

## 2025-08-22 ENCOUNTER — LAB (OUTPATIENT)
Dept: LAB | Facility: HOSPITAL | Age: 70
End: 2025-08-22
Payer: MEDICARE

## 2025-08-22 ENCOUNTER — APPOINTMENT (OUTPATIENT)
Dept: LAB | Facility: HOSPITAL | Age: 70
End: 2025-08-22
Payer: MEDICARE

## 2025-08-22 DIAGNOSIS — N39.0 RECURRENT UTI: ICD-10-CM

## 2025-08-22 LAB
ALBUMIN SERPL BCP-MCNC: 3.5 G/DL (ref 3.4–5)
ANION GAP SERPL CALC-SCNC: 11 MMOL/L (ref 10–20)
BUN SERPL-MCNC: 25 MG/DL (ref 6–23)
CALCIUM SERPL-MCNC: 9.2 MG/DL (ref 8.6–10.3)
CHLORIDE SERPL-SCNC: 105 MMOL/L (ref 98–107)
CO2 SERPL-SCNC: 28 MMOL/L (ref 21–32)
CREAT SERPL-MCNC: 0.77 MG/DL (ref 0.5–1.05)
CREAT UR-MCNC: 76.8 MG/DL (ref 20–320)
EGFRCR SERPLBLD CKD-EPI 2021: 85 ML/MIN/1.73M*2
ERYTHROCYTE [DISTWIDTH] IN BLOOD BY AUTOMATED COUNT: 13.2 % (ref 11.5–14.5)
GLUCOSE SERPL-MCNC: 98 MG/DL (ref 74–99)
HCT VFR BLD AUTO: 39.6 % (ref 36–46)
HGB BLD-MCNC: 12.8 G/DL (ref 12–16)
MAGNESIUM SERPL-MCNC: 1.71 MG/DL (ref 1.6–2.4)
MCH RBC QN AUTO: 29.8 PG (ref 26–34)
MCHC RBC AUTO-ENTMCNC: 32.3 G/DL (ref 32–36)
MCV RBC AUTO: 92 FL (ref 80–100)
NRBC BLD-RTO: 0 /100 WBCS (ref 0–0)
PHOSPHATE SERPL-MCNC: 3.4 MG/DL (ref 2.5–4.9)
PLATELET # BLD AUTO: 173 X10*3/UL (ref 150–450)
POTASSIUM SERPL-SCNC: 4.5 MMOL/L (ref 3.5–5.3)
PROT UR-ACNC: 8 MG/DL (ref 5–24)
PROT/CREAT UR: 0.1 MG/MG CREAT (ref 0–0.17)
RBC # BLD AUTO: 4.3 X10*6/UL (ref 4–5.2)
SODIUM SERPL-SCNC: 139 MMOL/L (ref 136–145)
WBC # BLD AUTO: 4.9 X10*3/UL (ref 4.4–11.3)

## 2025-08-22 PROCEDURE — 84156 ASSAY OF PROTEIN URINE: CPT

## 2025-08-22 PROCEDURE — 36415 COLL VENOUS BLD VENIPUNCTURE: CPT

## 2025-08-22 PROCEDURE — 85027 COMPLETE CBC AUTOMATED: CPT

## 2025-08-22 PROCEDURE — 83970 ASSAY OF PARATHORMONE: CPT

## 2025-08-22 PROCEDURE — 80197 ASSAY OF TACROLIMUS: CPT

## 2025-08-22 PROCEDURE — 82570 ASSAY OF URINE CREATININE: CPT

## 2025-08-23 ENCOUNTER — LAB (OUTPATIENT)
Dept: LAB | Facility: HOSPITAL | Age: 70
End: 2025-08-23
Payer: MEDICARE

## 2025-08-23 DIAGNOSIS — Z94.0 KIDNEY TRANSPLANT STATUS: Primary | ICD-10-CM

## 2025-08-23 LAB
25(OH)D3 SERPL-MCNC: 69 NG/ML (ref 30–100)
PTH-INTACT SERPL-MCNC: 65.3 PG/ML (ref 18.5–88)
TACROLIMUS BLD-MCNC: 6.2 NG/ML

## 2025-08-25 ENCOUNTER — SPECIALTY PHARMACY (OUTPATIENT)
Dept: PHARMACY | Facility: CLINIC | Age: 70
End: 2025-08-25

## 2025-08-25 PROCEDURE — RXMED WILLOW AMBULATORY MEDICATION CHARGE

## 2025-08-26 ENCOUNTER — PHARMACY VISIT (OUTPATIENT)
Dept: PHARMACY | Facility: CLINIC | Age: 70
End: 2025-08-26
Payer: COMMERCIAL

## 2025-08-29 DIAGNOSIS — N39.0 RECURRENT UTI: ICD-10-CM

## 2025-10-08 ENCOUNTER — APPOINTMENT (OUTPATIENT)
Dept: DERMATOLOGY | Facility: CLINIC | Age: 70
End: 2025-10-08
Payer: MEDICARE

## 2026-01-27 ENCOUNTER — APPOINTMENT (OUTPATIENT)
Dept: INFECTIOUS DISEASES | Facility: CLINIC | Age: 71
End: 2026-01-27
Payer: MEDICARE